# Patient Record
Sex: MALE | Race: WHITE | NOT HISPANIC OR LATINO | Employment: FULL TIME | URBAN - METROPOLITAN AREA
[De-identification: names, ages, dates, MRNs, and addresses within clinical notes are randomized per-mention and may not be internally consistent; named-entity substitution may affect disease eponyms.]

---

## 2017-06-09 ENCOUNTER — COMMUNICATION - HEALTHEAST (OUTPATIENT)
Dept: INTERNAL MEDICINE | Facility: CLINIC | Age: 42
End: 2017-06-09

## 2017-11-06 ENCOUNTER — OFFICE VISIT - HEALTHEAST (OUTPATIENT)
Dept: INTERNAL MEDICINE | Facility: CLINIC | Age: 42
End: 2017-11-06

## 2017-11-06 DIAGNOSIS — M10.9 GOUT: ICD-10-CM

## 2017-11-06 DIAGNOSIS — Z00.00 ANNUAL PHYSICAL EXAM: ICD-10-CM

## 2017-11-06 LAB
CHOLEST SERPL-MCNC: 264 MG/DL
FASTING STATUS PATIENT QL REPORTED: YES
HDLC SERPL-MCNC: 63 MG/DL
LDLC SERPL CALC-MCNC: 170 MG/DL
TRIGL SERPL-MCNC: 155 MG/DL

## 2017-11-06 ASSESSMENT — MIFFLIN-ST. JEOR: SCORE: 1741.49

## 2018-11-20 ENCOUNTER — OFFICE VISIT - HEALTHEAST (OUTPATIENT)
Dept: INTERNAL MEDICINE | Facility: CLINIC | Age: 43
End: 2018-11-20

## 2018-11-20 DIAGNOSIS — Z00.00 HEALTH CARE MAINTENANCE: ICD-10-CM

## 2018-11-20 DIAGNOSIS — F10.10 ALCOHOL ABUSE, EPISODIC DRINKING BEHAVIOR: ICD-10-CM

## 2018-11-20 DIAGNOSIS — M79.644 PAIN OF FINGER OF RIGHT HAND: ICD-10-CM

## 2018-11-20 DIAGNOSIS — Z00.00 ANNUAL PHYSICAL EXAM: ICD-10-CM

## 2018-11-20 LAB
ALBUMIN SERPL-MCNC: 4.4 G/DL (ref 3.5–5)
ALBUMIN UR-MCNC: ABNORMAL MG/DL
ALP SERPL-CCNC: 38 U/L (ref 45–120)
ALT SERPL W P-5'-P-CCNC: 20 U/L (ref 0–45)
ANION GAP SERPL CALCULATED.3IONS-SCNC: 8 MMOL/L (ref 5–18)
APPEARANCE UR: CLEAR
AST SERPL W P-5'-P-CCNC: 26 U/L (ref 0–40)
BACTERIA #/AREA URNS HPF: ABNORMAL HPF
BILIRUB SERPL-MCNC: 0.7 MG/DL (ref 0–1)
BILIRUB UR QL STRIP: NEGATIVE
BUN SERPL-MCNC: 17 MG/DL (ref 8–22)
CALCIUM SERPL-MCNC: 10.3 MG/DL (ref 8.5–10.5)
CHLORIDE BLD-SCNC: 101 MMOL/L (ref 98–107)
CHOLEST SERPL-MCNC: 257 MG/DL
CO2 SERPL-SCNC: 29 MMOL/L (ref 22–31)
COLOR UR AUTO: YELLOW
CREAT SERPL-MCNC: 1.07 MG/DL (ref 0.7–1.3)
ERYTHROCYTE [DISTWIDTH] IN BLOOD BY AUTOMATED COUNT: 11.1 % (ref 11–14.5)
FASTING STATUS PATIENT QL REPORTED: YES
GFR SERPL CREATININE-BSD FRML MDRD: >60 ML/MIN/1.73M2
GLUCOSE BLD-MCNC: 89 MG/DL (ref 70–125)
GLUCOSE UR STRIP-MCNC: NEGATIVE MG/DL
HBA1C MFR BLD: 5.2 % (ref 3.5–6)
HCT VFR BLD AUTO: 43.4 % (ref 40–54)
HDLC SERPL-MCNC: 84 MG/DL
HGB BLD-MCNC: 14.7 G/DL (ref 14–18)
HGB UR QL STRIP: NEGATIVE
HYALINE CASTS #/AREA URNS LPF: ABNORMAL LPF
KETONES UR STRIP-MCNC: ABNORMAL MG/DL
LDLC SERPL CALC-MCNC: 159 MG/DL
LEUKOCYTE ESTERASE UR QL STRIP: NEGATIVE
MCH RBC QN AUTO: 31.9 PG (ref 27–34)
MCHC RBC AUTO-ENTMCNC: 33.8 G/DL (ref 32–36)
MCV RBC AUTO: 94 FL (ref 80–100)
NITRATE UR QL: NEGATIVE
PH UR STRIP: 6 [PH] (ref 5–8)
PLATELET # BLD AUTO: 238 THOU/UL (ref 140–440)
PMV BLD AUTO: 7.6 FL (ref 7–10)
POTASSIUM BLD-SCNC: 4.5 MMOL/L (ref 3.5–5)
PROT SERPL-MCNC: 7.6 G/DL (ref 6–8)
RBC # BLD AUTO: 4.6 MILL/UL (ref 4.4–6.2)
RBC #/AREA URNS AUTO: ABNORMAL HPF
SODIUM SERPL-SCNC: 138 MMOL/L (ref 136–145)
SP GR UR STRIP: >=1.03 (ref 1–1.03)
SQUAMOUS #/AREA URNS AUTO: ABNORMAL LPF
TRIGL SERPL-MCNC: 70 MG/DL
UROBILINOGEN UR STRIP-ACNC: ABNORMAL
WBC #/AREA URNS AUTO: ABNORMAL HPF
WBC: 9.7 THOU/UL (ref 4–11)

## 2018-11-20 ASSESSMENT — MIFFLIN-ST. JEOR: SCORE: 1738.31

## 2019-01-30 ENCOUNTER — OFFICE VISIT - HEALTHEAST (OUTPATIENT)
Dept: INTERNAL MEDICINE | Facility: CLINIC | Age: 44
End: 2019-01-30

## 2019-01-30 DIAGNOSIS — E66.3 OVERWEIGHT (BMI 25.0-29.9): ICD-10-CM

## 2019-01-30 DIAGNOSIS — M13.10 MONOARTHRITIS: ICD-10-CM

## 2019-01-30 DIAGNOSIS — Z00.00 PHYSICAL EXAM: ICD-10-CM

## 2019-01-30 DIAGNOSIS — M10.9 PODAGRA: ICD-10-CM

## 2019-01-30 DIAGNOSIS — Z00.00 HEALTHCARE MAINTENANCE: ICD-10-CM

## 2019-01-30 DIAGNOSIS — Z11.3 SCREEN FOR STD (SEXUALLY TRANSMITTED DISEASE): ICD-10-CM

## 2019-01-30 LAB
ALBUMIN SERPL-MCNC: 4.8 G/DL (ref 3.5–5)
ALP SERPL-CCNC: 44 U/L (ref 45–120)
ALT SERPL W P-5'-P-CCNC: 27 U/L (ref 0–45)
ANION GAP SERPL CALCULATED.3IONS-SCNC: 13 MMOL/L (ref 5–18)
AST SERPL W P-5'-P-CCNC: 24 U/L (ref 0–40)
BILIRUB SERPL-MCNC: 0.5 MG/DL (ref 0–1)
BUN SERPL-MCNC: 13 MG/DL (ref 8–22)
CALCIUM SERPL-MCNC: 10.3 MG/DL (ref 8.5–10.5)
CHLORIDE BLD-SCNC: 103 MMOL/L (ref 98–107)
CHOLEST SERPL-MCNC: 226 MG/DL
CO2 SERPL-SCNC: 24 MMOL/L (ref 22–31)
CREAT SERPL-MCNC: 1.1 MG/DL (ref 0.7–1.3)
ERYTHROCYTE [DISTWIDTH] IN BLOOD BY AUTOMATED COUNT: 11.3 % (ref 11–14.5)
FASTING STATUS PATIENT QL REPORTED: YES
GFR SERPL CREATININE-BSD FRML MDRD: >60 ML/MIN/1.73M2
GLUCOSE BLD-MCNC: 81 MG/DL (ref 70–125)
HBA1C MFR BLD: 4.9 % (ref 3.5–6)
HCT VFR BLD AUTO: 46.3 % (ref 40–54)
HDLC SERPL-MCNC: 78 MG/DL
HGB BLD-MCNC: 15.5 G/DL (ref 14–18)
HIV 1+2 AB+HIV1 P24 AG SERPL QL IA: NEGATIVE
LDLC SERPL CALC-MCNC: 135 MG/DL
MCH RBC QN AUTO: 31.4 PG (ref 27–34)
MCHC RBC AUTO-ENTMCNC: 33.4 G/DL (ref 32–36)
MCV RBC AUTO: 94 FL (ref 80–100)
PLATELET # BLD AUTO: 245 THOU/UL (ref 140–440)
PMV BLD AUTO: 8.3 FL (ref 7–10)
POTASSIUM BLD-SCNC: 4.6 MMOL/L (ref 3.5–5)
PROT SERPL-MCNC: 8.4 G/DL (ref 6–8)
RBC # BLD AUTO: 4.92 MILL/UL (ref 4.4–6.2)
SODIUM SERPL-SCNC: 140 MMOL/L (ref 136–145)
TRIGL SERPL-MCNC: 67 MG/DL
WBC: 6 THOU/UL (ref 4–11)

## 2019-01-30 RX ORDER — CHLORAL HYDRATE 500 MG
2 CAPSULE ORAL DAILY
Status: SHIPPED | COMMUNITY
Start: 2019-01-30

## 2019-01-30 ASSESSMENT — MIFFLIN-ST. JEOR: SCORE: 1710.08

## 2019-01-31 LAB
C TRACH DNA SPEC QL PROBE+SIG AMP: NEGATIVE
HBV SURFACE AG SERPL QL IA: NEGATIVE
HCV AB SERPL QL IA: NEGATIVE
N GONORRHOEA DNA SPEC QL NAA+PROBE: NEGATIVE
T PALLIDUM AB SER QL: NEGATIVE

## 2020-01-29 ENCOUNTER — OFFICE VISIT - HEALTHEAST (OUTPATIENT)
Dept: INTERNAL MEDICINE | Facility: CLINIC | Age: 45
End: 2020-01-29

## 2020-01-29 DIAGNOSIS — E78.5 HYPERLIPIDEMIA LDL GOAL <130: ICD-10-CM

## 2020-01-29 DIAGNOSIS — E66.3 OVERWEIGHT (BMI 25.0-29.9): ICD-10-CM

## 2020-01-29 DIAGNOSIS — Z23 NEED FOR INFLUENZA VACCINATION: ICD-10-CM

## 2020-01-29 DIAGNOSIS — Z00.00 HEALTHCARE MAINTENANCE: ICD-10-CM

## 2020-01-29 DIAGNOSIS — Z84.81 FAMILY HISTORY OF BRCA GENE POSITIVE: ICD-10-CM

## 2020-01-29 DIAGNOSIS — Z00.00 ANNUAL PHYSICAL EXAM: ICD-10-CM

## 2020-01-30 ENCOUNTER — AMBULATORY - HEALTHEAST (OUTPATIENT)
Dept: LAB | Facility: CLINIC | Age: 45
End: 2020-01-30

## 2020-01-30 DIAGNOSIS — Z00.00 HEALTHCARE MAINTENANCE: ICD-10-CM

## 2020-01-30 DIAGNOSIS — Z84.81 FAMILY HISTORY OF BRCA GENE POSITIVE: ICD-10-CM

## 2020-01-30 DIAGNOSIS — Z00.00 ANNUAL PHYSICAL EXAM: ICD-10-CM

## 2020-01-30 DIAGNOSIS — E66.3 OVERWEIGHT (BMI 25.0-29.9): ICD-10-CM

## 2020-01-30 DIAGNOSIS — E78.5 HYPERLIPIDEMIA LDL GOAL <130: ICD-10-CM

## 2020-01-30 LAB
ALBUMIN SERPL-MCNC: 4.3 G/DL (ref 3.5–5)
ALP SERPL-CCNC: 51 U/L (ref 45–120)
ALT SERPL W P-5'-P-CCNC: 21 U/L (ref 0–45)
ANION GAP SERPL CALCULATED.3IONS-SCNC: 7 MMOL/L (ref 5–18)
AST SERPL W P-5'-P-CCNC: 24 U/L (ref 0–40)
BILIRUB SERPL-MCNC: 0.7 MG/DL (ref 0–1)
BUN SERPL-MCNC: 14 MG/DL (ref 8–22)
CALCIUM SERPL-MCNC: 9.7 MG/DL (ref 8.5–10.5)
CHLORIDE BLD-SCNC: 102 MMOL/L (ref 98–107)
CHOLEST SERPL-MCNC: 231 MG/DL
CO2 SERPL-SCNC: 30 MMOL/L (ref 22–31)
CREAT SERPL-MCNC: 1.38 MG/DL (ref 0.7–1.3)
ERYTHROCYTE [DISTWIDTH] IN BLOOD BY AUTOMATED COUNT: 10 % (ref 11–14.5)
FASTING STATUS PATIENT QL REPORTED: YES
GFR SERPL CREATININE-BSD FRML MDRD: 55 ML/MIN/1.73M2
GLUCOSE BLD-MCNC: 84 MG/DL (ref 70–125)
HBA1C MFR BLD: 5.1 % (ref 3.5–6)
HCT VFR BLD AUTO: 44.6 % (ref 40–54)
HDLC SERPL-MCNC: 69 MG/DL
HGB BLD-MCNC: 15 G/DL (ref 14–18)
LDLC SERPL CALC-MCNC: 154 MG/DL
MCH RBC QN AUTO: 32.4 PG (ref 27–34)
MCHC RBC AUTO-ENTMCNC: 33.6 G/DL (ref 32–36)
MCV RBC AUTO: 96 FL (ref 80–100)
PLATELET # BLD AUTO: 227 THOU/UL (ref 140–440)
PMV BLD AUTO: 9 FL (ref 7–10)
POTASSIUM BLD-SCNC: 4.6 MMOL/L (ref 3.5–5)
PROT SERPL-MCNC: 7.1 G/DL (ref 6–8)
RBC # BLD AUTO: 4.63 MILL/UL (ref 4.4–6.2)
SODIUM SERPL-SCNC: 139 MMOL/L (ref 136–145)
TRIGL SERPL-MCNC: 39 MG/DL
WBC: 5.5 THOU/UL (ref 4–11)

## 2020-02-12 ENCOUNTER — AMBULATORY - HEALTHEAST (OUTPATIENT)
Dept: FAMILY MEDICINE | Facility: CLINIC | Age: 45
End: 2020-02-12

## 2020-02-19 ENCOUNTER — AMBULATORY - HEALTHEAST (OUTPATIENT)
Dept: INTERNAL MEDICINE | Facility: CLINIC | Age: 45
End: 2020-02-19

## 2020-02-19 ENCOUNTER — OFFICE VISIT - HEALTHEAST (OUTPATIENT)
Dept: ONCOLOGY | Facility: HOSPITAL | Age: 45
End: 2020-02-19

## 2020-02-19 ENCOUNTER — HOSPITAL ENCOUNTER (OUTPATIENT)
Dept: CT IMAGING | Facility: CLINIC | Age: 45
Discharge: HOME | End: 2020-02-19

## 2020-02-19 ENCOUNTER — RECORDS - HEALTHEAST (OUTPATIENT)
Dept: ADMINISTRATIVE | Facility: OTHER | Age: 45
End: 2020-02-19

## 2020-02-19 DIAGNOSIS — Z71.83 ENCOUNTER FOR NONPROCREATIVE GENETIC COUNSELING: ICD-10-CM

## 2020-02-19 DIAGNOSIS — Z84.81 FAMILY HISTORY OF BRCA2 GENE POSITIVE: ICD-10-CM

## 2020-02-19 DIAGNOSIS — E78.5 HYPERLIPIDEMIA LDL GOAL <130: ICD-10-CM

## 2020-02-19 DIAGNOSIS — Z80.3 FAMILY HISTORY OF MALIGNANT NEOPLASM OF BREAST: ICD-10-CM

## 2020-02-19 DIAGNOSIS — R93.89 ABNORMAL CT OF THE CHEST: ICD-10-CM

## 2020-02-19 LAB
CV CALCIUM SCORE AGATSTON LM: 0
CV CALCIUM SCORING AGATSON LAD: 0
CV CALCIUM SCORING AGATSTON CX: 0
CV CALCIUM SCORING AGATSTON RCA: 1
CV CALCIUM SCORING AGATSTON TOTAL: 1

## 2020-02-21 ENCOUNTER — COMMUNICATION - HEALTHEAST (OUTPATIENT)
Dept: INTERNAL MEDICINE | Facility: CLINIC | Age: 45
End: 2020-02-21

## 2020-03-20 ENCOUNTER — HOSPITAL ENCOUNTER (OUTPATIENT)
Dept: CT IMAGING | Facility: HOSPITAL | Age: 45
Discharge: HOME | End: 2020-03-20

## 2020-03-20 DIAGNOSIS — R93.89 ABNORMAL CT OF THE CHEST: ICD-10-CM

## 2020-03-23 ENCOUNTER — COMMUNICATION - HEALTHEAST (OUTPATIENT)
Dept: INTERNAL MEDICINE | Facility: CLINIC | Age: 45
End: 2020-03-23

## 2020-03-31 ENCOUNTER — OFFICE VISIT - HEALTHEAST (OUTPATIENT)
Dept: ONCOLOGY | Facility: HOSPITAL | Age: 45
End: 2020-03-31

## 2020-03-31 DIAGNOSIS — Z80.3 FAMILY HISTORY OF MALIGNANT NEOPLASM OF BREAST: ICD-10-CM

## 2020-03-31 DIAGNOSIS — Z84.81 FAMILY HISTORY OF BRCA2 GENE POSITIVE: ICD-10-CM

## 2020-08-27 ENCOUNTER — COMMUNICATION - HEALTHEAST (OUTPATIENT)
Dept: INTERNAL MEDICINE | Facility: CLINIC | Age: 45
End: 2020-08-27

## 2020-08-28 ENCOUNTER — COMMUNICATION - HEALTHEAST (OUTPATIENT)
Dept: INTERNAL MEDICINE | Facility: CLINIC | Age: 45
End: 2020-08-28

## 2021-02-08 ENCOUNTER — OFFICE VISIT - HEALTHEAST (OUTPATIENT)
Dept: FAMILY MEDICINE | Facility: CLINIC | Age: 46
End: 2021-02-08

## 2021-02-08 DIAGNOSIS — M1A.9XX0 CHRONIC GOUT WITHOUT TOPHUS, UNSPECIFIED CAUSE, UNSPECIFIED SITE: ICD-10-CM

## 2021-02-08 DIAGNOSIS — E78.5 HYPERLIPIDEMIA WITH TARGET LDL LESS THAN 70: ICD-10-CM

## 2021-02-08 DIAGNOSIS — Z23 NEED FOR TD VACCINE: ICD-10-CM

## 2021-02-08 DIAGNOSIS — Z13.1 SCREENING FOR DIABETES MELLITUS: ICD-10-CM

## 2021-02-08 DIAGNOSIS — Z00.00 PREVENTATIVE HEALTH CARE: ICD-10-CM

## 2021-02-08 DIAGNOSIS — E66.3 OVERWEIGHT: ICD-10-CM

## 2021-02-08 DIAGNOSIS — Z11.3 ROUTINE SCREENING FOR STI (SEXUALLY TRANSMITTED INFECTION): ICD-10-CM

## 2021-02-08 DIAGNOSIS — N18.31 STAGE 3A CHRONIC KIDNEY DISEASE (H): ICD-10-CM

## 2021-02-08 LAB
ANION GAP SERPL CALCULATED.3IONS-SCNC: 11 MMOL/L (ref 5–18)
BUN SERPL-MCNC: 11 MG/DL (ref 8–22)
CALCIUM SERPL-MCNC: 9.5 MG/DL (ref 8.5–10.5)
CHLORIDE BLD-SCNC: 103 MMOL/L (ref 98–107)
CHOLEST SERPL-MCNC: 218 MG/DL
CO2 SERPL-SCNC: 27 MMOL/L (ref 22–31)
CREAT SERPL-MCNC: 1.07 MG/DL (ref 0.7–1.3)
ERYTHROCYTE [DISTWIDTH] IN BLOOD BY AUTOMATED COUNT: 12.2 % (ref 11–14.5)
FASTING STATUS PATIENT QL REPORTED: ABNORMAL
GFR SERPL CREATININE-BSD FRML MDRD: >60 ML/MIN/1.73M2
GLUCOSE BLD-MCNC: 102 MG/DL (ref 70–125)
HBA1C MFR BLD: 5.1 %
HCT VFR BLD AUTO: 44.3 % (ref 40–54)
HDLC SERPL-MCNC: 61 MG/DL
HGB BLD-MCNC: 15.2 G/DL (ref 14–18)
HIV 1+2 AB+HIV1 P24 AG SERPL QL IA: NEGATIVE
LDLC SERPL CALC-MCNC: 147 MG/DL
MCH RBC QN AUTO: 31.9 PG (ref 27–34)
MCHC RBC AUTO-ENTMCNC: 34.3 G/DL (ref 32–36)
MCV RBC AUTO: 93 FL (ref 80–100)
PLATELET # BLD AUTO: 245 THOU/UL (ref 140–440)
PMV BLD AUTO: 10.1 FL (ref 7–10)
POTASSIUM BLD-SCNC: 4.8 MMOL/L (ref 3.5–5)
RBC # BLD AUTO: 4.76 MILL/UL (ref 4.4–6.2)
SODIUM SERPL-SCNC: 141 MMOL/L (ref 136–145)
TRIGL SERPL-MCNC: 50 MG/DL
TSH SERPL DL<=0.005 MIU/L-ACNC: 1.8 UIU/ML (ref 0.3–5)
WBC: 6.5 THOU/UL (ref 4–11)

## 2021-02-08 RX ORDER — INDOMETHACIN 25 MG/1
CAPSULE ORAL
Status: SHIPPED | COMMUNITY
Start: 2020-11-24 | End: 2022-10-11

## 2021-02-08 ASSESSMENT — ANXIETY QUESTIONNAIRES
7. FEELING AFRAID AS IF SOMETHING AWFUL MIGHT HAPPEN: NOT AT ALL
6. BECOMING EASILY ANNOYED OR IRRITABLE: NOT AT ALL
3. WORRYING TOO MUCH ABOUT DIFFERENT THINGS: NOT AT ALL
2. NOT BEING ABLE TO STOP OR CONTROL WORRYING: NOT AT ALL
1. FEELING NERVOUS, ANXIOUS, OR ON EDGE: NOT AT ALL
4. TROUBLE RELAXING: NOT AT ALL
IF YOU CHECKED OFF ANY PROBLEMS ON THIS QUESTIONNAIRE, HOW DIFFICULT HAVE THESE PROBLEMS MADE IT FOR YOU TO DO YOUR WORK, TAKE CARE OF THINGS AT HOME, OR GET ALONG WITH OTHER PEOPLE: NOT DIFFICULT AT ALL
GAD7 TOTAL SCORE: 0
5. BEING SO RESTLESS THAT IT IS HARD TO SIT STILL: NOT AT ALL

## 2021-02-08 ASSESSMENT — MIFFLIN-ST. JEOR: SCORE: 1736.5

## 2021-02-08 ASSESSMENT — PATIENT HEALTH QUESTIONNAIRE - PHQ9: SUM OF ALL RESPONSES TO PHQ QUESTIONS 1-9: 0

## 2021-02-09 ENCOUNTER — COMMUNICATION - HEALTHEAST (OUTPATIENT)
Dept: FAMILY MEDICINE | Facility: CLINIC | Age: 46
End: 2021-02-09

## 2021-02-09 LAB
C TRACH DNA SPEC QL PROBE+SIG AMP: NEGATIVE
N GONORRHOEA DNA SPEC QL NAA+PROBE: NEGATIVE
T PALLIDUM AB SER QL: NEGATIVE

## 2021-02-24 ENCOUNTER — OFFICE VISIT - HEALTHEAST (OUTPATIENT)
Dept: FAMILY MEDICINE | Facility: CLINIC | Age: 46
End: 2021-02-24

## 2021-02-24 DIAGNOSIS — E78.2 MIXED HYPERLIPIDEMIA: ICD-10-CM

## 2021-02-24 DIAGNOSIS — M1A.40X0 OTHER SECONDARY CHRONIC GOUT WITHOUT TOPHUS, UNSPECIFIED SITE: ICD-10-CM

## 2021-02-24 ASSESSMENT — MIFFLIN-ST. JEOR: SCORE: 1736.95

## 2021-05-27 ASSESSMENT — PATIENT HEALTH QUESTIONNAIRE - PHQ9: SUM OF ALL RESPONSES TO PHQ QUESTIONS 1-9: 0

## 2021-05-28 ASSESSMENT — ANXIETY QUESTIONNAIRES: GAD7 TOTAL SCORE: 0

## 2021-05-31 VITALS — BODY MASS INDEX: 26.46 KG/M2 | HEIGHT: 71 IN | WEIGHT: 189 LBS

## 2021-06-02 VITALS — WEIGHT: 188.3 LBS | BODY MASS INDEX: 26.36 KG/M2 | HEIGHT: 71 IN

## 2021-06-02 VITALS — WEIGHT: 181.2 LBS | BODY MASS INDEX: 25.37 KG/M2 | HEIGHT: 71 IN

## 2021-06-04 VITALS
HEART RATE: 54 BPM | BODY MASS INDEX: 26.03 KG/M2 | WEIGHT: 185.3 LBS | DIASTOLIC BLOOD PRESSURE: 80 MMHG | SYSTOLIC BLOOD PRESSURE: 120 MMHG

## 2021-06-05 VITALS
HEIGHT: 71 IN | SYSTOLIC BLOOD PRESSURE: 104 MMHG | BODY MASS INDEX: 26.32 KG/M2 | DIASTOLIC BLOOD PRESSURE: 68 MMHG | WEIGHT: 188 LBS

## 2021-06-05 VITALS
HEART RATE: 50 BPM | WEIGHT: 187.9 LBS | SYSTOLIC BLOOD PRESSURE: 128 MMHG | TEMPERATURE: 98.1 F | OXYGEN SATURATION: 100 % | HEIGHT: 71 IN | DIASTOLIC BLOOD PRESSURE: 80 MMHG | BODY MASS INDEX: 26.31 KG/M2

## 2021-06-05 NOTE — PROGRESS NOTES
"Assessment:   1. Annual physical exam  Healthy adult male.  Unfortunately, I explained to him that my practice is somewhat restricted, and that I can only see certain physicians' patient panels through my collaborative practice model.  He would like to see a family medicine provider at the [facility] moving forward.    2. Healthcare maintenance  Colonoscopy and PSA to begin at age 48.  He would like his influenza vaccine today.    3. Family history of BRCA gene positive  His mother tested positive for the BRCA gene.  He has a male cousin who was diagnosed with breast cancer.  He would like to be screened for the BRCA gene.  I told him we could refer him to hematology/oncology for further evaluation.    4. Hyperlipidemia LDL goal <130  Borderline elevated LDL in the past, but has seen improvements in his fasting lipid profile with \"whole 30\" diet.  He wants further risk stratification with coronary artery calcium score testing.  I gave him the number for [facility] to set this up.    5. Overweight (BMI 26)  He is of a more muscular build and considers himself to be physically active.  He has cut down on alcohol since last year, which I congratulated him on.    6. Need for influenza vaccination  - Influenza, Seasonal Quad, PF =/> 6months         Plan:     Patient Instructions   Call [facility] to set up your coronary artery calcium score testing.  Number is [phone].    We will help you schedule a \"lab only\" appointment today.    I placed a referral to genetic counseling today for BRCA testing.  Call [phone].  This does not look like something I will be able to screen for today.    Blood pressure and other vital signs look good today.    When your lab results come through, I will fax your biometric form for you.    I recommend that you set up a \"establish care\" appointment with a family practice physician at the [facility].      Subjective:     Here for physical exam. Chart " reviewed    Dentist every 6 months.  No new hearing or vision changes.  No problems with depression or anxiety.  Works as a  for [employer].  Happy with work.  In a monogamous relationship with his girlfriend.  Does not smoke.      No past medical history on file.  No past surgical history on file.  Patient has no known allergies.  Family History   Problem Relation Age of Onset     Hyperlipidemia Mother      No Medical Problems Father      No Medical Problems Brother      No Medical Problems Daughter      No Medical Problems Son      Social History     Socioeconomic History     Marital status:      Spouse name: Not on file     Number of children: Not on file     Years of education: Not on file     Highest education level: Not on file   Occupational History     Not on file   Social Needs     Financial resource strain: Not on file     Food insecurity:     Worry: Not on file     Inability: Not on file     Transportation needs:     Medical: Not on file     Non-medical: Not on file   Tobacco Use     Smoking status: Never Smoker     Smokeless tobacco: Never Used   Substance and Sexual Activity     Alcohol use: Yes     Alcohol/week: 10.0 standard drinks     Types: 10 Cans of beer per week     Drug use: No     Sexual activity: Yes     Partners: Female   Lifestyle     Physical activity:     Days per week: Not on file     Minutes per session: Not on file     Stress: Not on file   Relationships     Social connections:     Talks on phone: Not on file     Gets together: Not on file     Attends Latter day service: Not on file     Active member of club or organization: Not on file     Attends meetings of clubs or organizations: Not on file     Relationship status: Not on file     Intimate partner violence:     Fear of current or ex partner: Not on file     Emotionally abused: Not on file     Physically abused: Not on file     Forced sexual activity: Not on file   Other Topics Concern     Not on file   Social History  Narrative     Not on file         Review of Systems  Please see form B           Objective:     Vitals:    01/29/20 1304   BP: 120/80   Pulse: (!) 54   Weight: 185 lb 4.8 oz (84.1 kg)       Physical  General Appearance: Alert, cooperative, no distress, appears stated age  Head: Normocephalic, without obvious abnormality, atraumatic  Eyes: PERRL, fundi not observed, EOM's intact  Ears: Normal TM's and external ear canals bilateral  Nose: No abnormalities noted  Mouth and Throat: Normal appearance   Neck: Supple, symmetrical, trachea midline, no adenopathy or thyromegally,  no tenderness/mass/nodules; no carotid bruit or JVD  Back: no CVA tenderness or spinous process pain  Lungs: Clear to auscultation bilaterally, good air movent  Heart: Regular rate and rhythm, S1 and S2 normal, no murmur, rub, or gallop,  Abdomen: Soft, non-tender, no masses, no organomegaly  Genitourinary:.  No hernias   Rectal exam:    Musculoskeletal: No gross abnormalities    Extremities: Extremities normal, atraumatic, no cyanosis or edema, pulses 2/4 and symmetric  Skin:  no  worrisome lesions noted  Lymph nodes: Cervical, supraclavicular, groin, and axillary nodes normal  Neurologic: CN2-12 intact, normal sensation, DTRs 2/4 and symmetric.   Psychiatric:  normal mood and affect.      Current Outpatient Medications   Medication Sig Dispense Refill     cholecalciferol, vitamin D3, (VITAMIN D3 ORAL) Take 1-2 tablets by mouth daily.       MULTIVITAMIN ORAL Take 1 tablet by mouth daily.       omega-3/dha/epa/fish oil (FISH OIL-OMEGA-3 FATTY ACIDS) 300-1,000 mg capsule Take 2 g by mouth daily.       No current facility-administered medications for this visit.

## 2021-06-05 NOTE — PATIENT INSTRUCTIONS - HE
"Call [facility] to set up your coronary artery calcium score testing.  Number is [phone].    We will help you schedule a \"lab only\" appointment today.    I placed a referral to genetic counseling today for BRCA testing.  Call [phone].  This does not look like something I will be able to screen for today.    Blood pressure and other vital signs look good today.    When your lab results come through, I will fax your biometric form for you.    I recommend that you set up a \"establish care\" appointment with a family practice physician at the [facility].  "

## 2021-06-06 NOTE — TELEPHONE ENCOUNTER
"Please call patient and inform him about his coronary artery calcium score test.    His score was 1, placing him at a low risk of future cardiac events.  Based on his other risk factors, I would say we do not have to start cholesterol medication or low-dose aspirin right now.    I would encourage him to schedule a \"establish care\" with a family practice provider within the next 3 to 6 months.    We will mail the results to him.    "

## 2021-06-06 NOTE — PROGRESS NOTES
2/19/2020    Referring Provider: Dr. Couch    Presenting Information:   I met with Kem Almonte today for genetic counseling at the [facility] to discuss about the known BRCA2 mutation in his family. Specifically, the familial BRCA2 mutation is a deletion of exons 1 through 2. He is here today to review this history, cancer screening recommendations, and available genetic testing options.    Personal History:  Kem is a 45 y.o. male.  He  does not have any personal history of cancer.      He has not had a colonoscopy yet due to his age.   He does not regularly do any other cancer screening at this time.  Kem reported no tobacco use, and about 8 drinks per week for alcohol use.    Family History: (Please see scanned pedigree for detailed family history information)  Children/Siblings    Khanh has one daughter, age 16, and one son, age 14; both reportedly healthy.    Khanh has two brothers, ages 55 and 58; both with no reported cancer histories. Khanh shared that he does not believe either of his brothers have completed genetic testing at this time.   Maternal    Khanh's mother, age 79, has no personal history of cancer but underwent genetic testing due to her sister's history and tested positive for a pathogenic BRCA2 mutation.    Khanh's maternal aunt, age 74, has a history of breast cancer at age 42 and an unknown type of skin cancer at age 53. This aunt underwent LEELA-BSO in 2004. Khanh reports that he believes that this aunt has also tested positive for the same BRCA2 mutation.     Khanh's maternal grandmother passed away at age 69 with a history of breast cancer diagnosed at age 60.    Khanh's maternal grandmother's has a brother who had a history of an unknown type of cancer.    Khanh's maternal grandmother's brother's son was diagnosed with a breast cancer at age 60. Khanh reports that he believes this relative has tested positive for the familial BRCA2 mutation.    Khanh's  maternal grandmother has another brother has a daughter who has a history of cervical cancer and a grandson with a history of pancreatic cancer in his late 50's.    Khanh's maternal grandmother has another brother who has a daughter with a history of breast cancer in her mid 70's.     Khanh's maternal grandmother's mother passed away in her 40's from breast cancer diagnosed in her 40's.  Paternal    Khanh's father, age 79, has no reported cancer history    Khanh has two paternal aunts, ages 73 and 77, and one paternal uncle, age 70, with no reported cancer history.     Khanh's paternal grandmother passed away at age 93 from Alzheimer's disease and no reported cancer history.    Khanh's paternal grandfather passed away at age 95 from age-related health issues with no reported cancer history.    His maternal ethnicity is Jamaican, English, Citizen of the [place], and Syrian [place]ian. His paternal ethnicity is Frisian and Citizen of the [place].  There is no known Ashkenazi Worship ancestry on either side of his family. There is no reported consanguinity.    Discussion:    Cj has a family history of female breast, male breast, and pancreatic cancer with a known familial BRCA2 mutation in his mother. Specifically, the familial BRCA2 mutation identified in his mother is a deletion of exons 1 through 2.    We discussed the natural history and genetics of Hereditary Breast and Ovarian Cancer caused by mutations in the BRCA2 gene. A detailed handout regarding the information we discussed was provided to Kem at the end of our appointment today.  Topics included: inheritance pattern, cancer risks, cancer screening recommendations, and also risks, benefits and limitations of testing.    We discussed the autosomal dominant inheritance of pathogenic mutations in the BRCA2 gene. Based on Kem's mother's genetic test result, Kem has a 50% chance of also carrying the same genetic change in the BRCA2 gene.    Based on this, Kem meets the National  Comprehensive Cancer Network (NCCN) criteria for genetic testing of the familial BRCA2 mutation.      We reviewed that the most common cause of hereditary breast cancer is Hereditary Breast and Ovarian Cancer (HBOC) syndrome, which is caused by mutations in the genes BRCA1 and BRCA2.  WBRCA1 and BRCA2 are two genes that increase the risk for breast and ovarian cancers, among others. Women who inherit a BRCA mutation have a 50 to 85% lifetime risk of breast cancer and a 15 to 45% lifetime risk of ovarian cancer. This is higher than the general population lifetime risks of 12% for breast cancer and less than 2% for ovarian cancer. Men with BRCA gene mutations have up to a 7% risk of breast cancer and 20% risk of prostate cancer. Other cancers, such as pancreatic cancer and melanoma, have also been associated with BRCA mutations.    We discussed the implications of both positive and negative test result for Kem.    If testing comes back positive, Kem will be recommended to follow the screening recommendations for men with BRCA2 mutations as published by the National Comprehensive Cancer Network (NCCN). Men with BRCA2 mutations qualify for breast cancer screening in the form of clinical breast exams once per year starting at age 35 and annual PSA screening starting at age 45.     If testing comes back negative, Kem would not be at an increased risk for BRCA2-associated cancers.     Genetic testing is available for: BRCA2 site-specific testing for the known familial mutation (deletion of exons 1 through 2).    Consent was obtained and site-specific BRCA2 testing was sent to [facility]. A copy of his mother's BRCA2 positive genetic testing report from [facility] was sent along with the testing to the laboratory.     Medical Management: For Kem, we reviewed that the information from genetic testing may determine:    additional cancer screening for which Kem may qualify (i.e. clinical breast exams, PSA  screening, more frequent dermatologic exams, etc.),     and targeted chemotherapies for Kem  if he were to develop certain cancers in the future (i.e. Immunotherapy for individuals with Enciso syndrome, PARP inhibitors, etc.).      These recommendations will be discussed in detail once genetic testing is completed.    Plan:  1) Today Kem elected to proceed with site-specific BRCA2 genetic testing through [facility].  2) This information should be available in approximately 2 weeks.  3) Kem will be contacted by our scheduling department to set up a result disclosure appointment either in person or over the phone.     Face to face time: 45 minutes    Trinidad Mo MS, [facility]  Licensed Genetic Counselor  [facility]  [phone]

## 2021-06-07 NOTE — TELEPHONE ENCOUNTER
Please call the patient and let him know that his CT scan is back. It looks normal. The inflammation that was seen on his prior CT scan is now gone.    Please mail him results.

## 2021-06-07 NOTE — PROGRESS NOTES
"3/31/2020    Referring Provider: Garry Couch CNP    Presenting Information:  I spoke with Khanh over the phone todayto discuss his genetic testing results. His blood was drawn on 2/19/2020 and we ordered site-specific BRCA2 testing from [facility]. This testing was done because of his family history of breast cancer and his mother's positive genetic test results for a mutation in the BRCA2 gene.    Genetic Testing Results: NEGATIVE   Kem's test results were negative. The mutation that was identified in his mother was in the BRCA2 gene. Specifically this mutation is called 5'UTR_EX1del (also known as del exons 1-2). Kem does not have the mutation previously identified in his mother. This test only looked for this one mutation.    A copy of the test report can be found in the Media tab and named \"Genetics [facility]\". The report is scanned in as a linked document.    Interpretation:  Based on this test result, Kem does not have Hereditary Breast and Ovarian Cancer syndrome and is not at an increased risk for the associated cancers. Even though he does not have the familial BRCA2 mutation, he is still at the general population lifetime risk for the BRCA2 associated cancers.       Screening:  We discussed the importance of cancer screening based on Kem's personal and family history:    Other population cancer screening options, such as those recommended by the American Cancer Society and the National Comprehensive Cancer Network (NCCN), are also appropriate for Kem and his family. These screening recommendations may change if there are changes to Kem's personal and/or family history. Final screening recommendations should be made by each individual's managing physician.    Inheritance:  We reviewed the autosomal dominant inheritance of this BRCA2 mutation. We discussed that Kem cannot pass on this mutation to his children based on this test result.  Mutations in this gene do not skip " generations.      Additional Testing Considerations:  Although Kem's genetic testing result was negative, other relatives may still carry the familial BRCA2 mutation and/or a different gene mutation associated with the cancers in his family. Genetic counseling is recommended for his brothers and extended maternal relatives to discuss genetic testing options. If any of these relatives do pursue genetic testing, Kem is encouraged to contact me so that we may review the impact of their test results on Kem.    Plan:  1. A copy of his test results will be mailed to Khanh.   2. Kem is encouraged to contact me annually and/or with any changes to his personal/familiy history, as this information may inform future cancer screening or genetic testing recommendations.  3. If there are any further questions or concerns, he should not hesitate to contact me at [phone].    Time spent over the phone: 10 minutes    Trinidad Mo MS, [facility]  Licensed Genetic Counselor  [facility]  [phone]

## 2021-06-13 NOTE — PROGRESS NOTES
Subjective:     Kem Almonte is a 42 y.o. male who presents for an annual exam. 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 He needs biometric labs today. He has a history of gout but has not had a flare up in some time. He has controlled it he says by cutting down on grains. E    He has no concerns today. He feels well emotionally. He has a teenage daughter. He is  and is currently in monogamous relationship with a new partner.        The patient reports that there is not domestic violence in his life.     Healthy Habits:   Regular Exercise: Yes  Sunscreen Use: Yes  Healthy Diet: Yes  Dental Visits Regularly: Yes  Sexually active: Yes  Colonoscopy: No    Immunization History   Administered Date(s) Administered     Hep A, historic 07/15/2011, 01/17/2012     Hep B, historic 07/15/2011, 01/17/2012, 04/17/2012     Influenza, seasonal,quad inj 36+ mos 09/28/2015, 11/06/2017     Tdap 07/15/2011     Typhoid Live 07/15/2011     Immunization status: up to date and documented.    No current outpatient prescriptions on file.     No current facility-administered medications for this visit.      No past medical history on file.  No past surgical history on file.  Review of patient's allergies indicates no known allergies.  Family History   Problem Relation Age of Onset     Hyperlipidemia Mother      No Medical Problems Father      No Medical Problems Brother      No Medical Problems Daughter      No Medical Problems Son      Social History     Social History     Marital status:      Spouse name: N/A     Number of children: N/A     Years of education: N/A     Occupational History     Not on file.     Social History Main Topics     Smoking status: Never Smoker     Smokeless tobacco: Never Used     Alcohol use 6.0 oz/week     10 Cans of beer per week     Drug use: No     Sexual activity: Yes     Partners: Female     Other Topics Concern     Not on file     Social History Narrative  "      Review of Systems  General:  Negative   Eyes: Negative  Ears/Nose/Throat: Negative  Cardiovascular: Negative   Respiratory:  Negative  Gastrointestinal:  Negative   Genitourinary: Negative   Musculoskeletal:  Negative  Skin: Negative   Neurologic: Negative   Psychiatric: Negative   Endocrine: Negative  Heme/Lymphatic:Negative   Allergic/Immunologic: Negative      Objective:     /84  Pulse 82  Ht 5' 10.5\" (1.791 m)  Wt 189 lb (85.7 kg)  SpO2 97%  BMI 26.74 kg/m2    Physical  General Appearance: Alert, cooperative, no distress, appears stated age  Head: Normocephalic, without obvious abnormality, atraumatic  Eyes: PERRL, conjunctiva/corneas clear, EOM's intact  Ears: Normal TM's and external ear canals, both ears  Nose: Nares normal, septum midline,mucosa normal, no drainage  Throat: Lips, mucosa, and tongue normal.  Neck: Supple, symmetrical, trachea midline, no adenopathy;  thyroid: not enlarged, symmetric, no tenderness/mass/nodules.  Back: Symmetric, no curvature, ROM normal, no CVA tenderness  Lungs: Clear to auscultation bilaterally, respirations unlabored  Heart: Regular rate and rhythm, S1 and S2 normal, no murmur, rub, or gallop,  Abdomen: Soft, non-tender, bowel sounds active all four quadrants,  no masses, no organomegaly  Genitourinary: Penis normal.  No scrotal masses.  No hernias palpated.  Musculoskeletal: Normal range of motion. No joint swelling or deformity.   Extremities: Extremities normal, atraumatic, no cyanosis or edema  Skin: Skin color, texture, turgor normal, no rashes or lesions  Lymph nodes: Cervical, supraclavicular, and axillary nodes normal  Neurologic: He is alert. He has normal reflexes.   Psychiatric: He has a normal mood and affect.     Assessment/Plan :     Healthy male exam.    1. Annual physical exam  Basic Metabolic Panel    Lipid Cascade    Influenza, Seasonal,Quad Inj, 36+ MOS   2. Gout - will check a uric acid today Uric Acid     Once labs are back, will fill " out his biometric screening form and fax results to [facility]    I recommended he eat a healthy diet and get regular exercise.    Kleber Gore MD  [facility]

## 2021-06-15 NOTE — PROGRESS NOTES
Progress Note     ASSESSMENT/PLAN:    1. Mixed hyperlipidemia     2. 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 Mild elevation of LDL and total cholestrol. HDL within range. Patient is already realtively halthy. Discussed ways to tweak his diet to help bring that number down further. No need for statin therapy. ASCVD 1.8%.     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 No refills on indomethacin need. Has an aviation doctor and he does the refills for him.     Has baseline EKG done via the aviation doctor later in the year and he will send me a record of them       There are no discontinued medications.    RTO: 1 year for prevent     No follow-ups on file.    CHIEF COMPLAINT:  Chief Complaint   Patient presents with     Establish Care     no concerns       HISTORY OF PRESENT ILLNESS:  Kem Almonte is a 46 y.o. male who presents today for review of his bloodwork.       REVIEW OF SYSTEMS:   All other systems are negative.      TOBACCO USE:  Social History     Tobacco Use   Smoking Status Never Smoker   Smokeless Tobacco Never Used       MEDICATIONS:  Current Outpatient Medications   Medication Sig Dispense Refill     cholecalciferol, vitamin D3, (VITAMIN D3 ORAL) Take 1-2 tablets by mouth daily.       ID NOW COVID-19 TEST KIT Kit TEST AS DIRECTED       indomethacin (INDOCIN) 25 MG capsule        MULTIVITAMIN ORAL Take 1 tablet by mouth daily.       omega-3/dha/epa/fish oil (FISH OIL-OMEGA-3 FATTY ACIDS) 300-1,000 mg capsule Take 2 g by mouth daily.       No current facility-administered medications for this visit.          Immunization History   Administered Date(s) Administered     Hep A, Adult IM (19yr & older) 07/15/2011, 01/17/2012     Hep A, historic 07/15/2011, 01/17/2012     Hep B, Adult 07/15/2011, 01/17/2012, 04/17/2012     Hep  "B, historic 07/15/2011, 01/17/2012, 04/17/2012     INFLUENZA,SEASONAL QUAD, PF, =/> 6months 01/29/2020     Influenza, inj, historic,unspecified 09/28/2015, 11/06/2017, 11/01/2020     Influenza,seasonal, Inj IIV3 11/06/2003     Influenza,seasonal,quad inj =/> 6months 09/28/2015, 11/06/2017     Td, adult adsorbed, PF 02/08/2021     Tdap 07/15/2011     Typhoid Live, Oral 07/15/2011         VITALS:  Vitals:    02/24/21 1101   BP: 104/68   Patient Site: Right Arm   Patient Position: Sitting   Cuff Size: Adult Regular   Weight: 188 lb (85.3 kg)   Height: 5' 10.5\" (1.791 m)     Wt Readings from Last 3 Encounters:   02/24/21 188 lb (85.3 kg)   02/08/21 187 lb 14.4 oz (85.2 kg)   01/29/20 185 lb 4.8 oz (84.1 kg)     Body mass index is 26.59 kg/m .      PHYSICAL EXAM:  Constitutional:  Reveals a pleasant male, NAD.  Vitals:  Per nursing notes.  Psychiatric:  Mood appropriate, memory intact.     "

## 2021-06-15 NOTE — TELEPHONE ENCOUNTER
Biometric form completed by Dr. Rai. Faxed form to [employer] at [phone]. Original placed in scanning. Left voicemail for patient relaying that this was done.

## 2021-06-16 PROBLEM — M1A.9XX0 CHRONIC GOUT WITHOUT TOPHUS, UNSPECIFIED CAUSE, UNSPECIFIED SITE: Status: ACTIVE | Noted: 2021-02-08

## 2021-06-16 PROBLEM — E78.5 HYPERLIPIDEMIA WITH TARGET LDL LESS THAN 70: Status: ACTIVE | Noted: 2021-02-08

## 2021-06-20 NOTE — LETTER
"Letter by Trinidad Mo, Genetic Counselor at      Author: Trinidad Mo, Genetic Counselor Service: -- Author Type: --    Filed:  Encounter Date: 3/31/2020 Status: (Other)         Kem Almonte  [address] [facility] [address] [facility] [address]      March 31, 2020      Dear Mr. Almonte,    It was a pleasure speaking with you on the phone on 03/31/202.  Here is a copy of the progress note from our discussion.  If you have any additional questions, please feel free to call.    Referring Provider: Garry Couch CNP    Presenting Information:  I spoke with Khanh over the phone todayto discuss his genetic testing results. His blood was drawn on 2/19/2020 and we ordered site-specific BRCA2 testing from [facility]. This testing was done because of his family history of breast cancer and his mother's positive genetic test results for a mutation in the BRCA2 gene.    Genetic Testing Results: NEGATIVE   Kem's test results were negative. The mutation that was identified in his mother was in the BRCA2 gene. Specifically this mutation is called 5'UTR_EX1del (also known as del exons 1-2). Kem does not have the mutation previously identified in his mother. This test only looked for this one mutation.    A copy of the test report can be found in the Media tab and named \"Genetics Scan-Subtextual\". The report is scanned in as a linked document.    Interpretation:  Based on this test result, Kem does not have Hereditary Breast and Ovarian Cancer syndrome and is not at an increased risk for the associated cancers. Even though he does not have the familial BRCA2 mutation, he is still at the general population lifetime risk for the BRCA2 associated cancers.       Screening:  We discussed the importance of cancer screening based on Kem's personal and family history:    Other population cancer screening options, such as those recommended by the American Cancer Society and the National Comprehensive Cancer Network " (NCCN), are also appropriate for Kem and his family. These screening recommendations may change if there are changes to Kem's personal and/or family history. Final screening recommendations should be made by each individual's managing physician.    Inheritance:  We reviewed the autosomal dominant inheritance of this BRCA2 mutation. We discussed that Kem cannot pass on this mutation to his children based on this test result.  Mutations in this gene do not skip generations.      Additional Testing Considerations:  Although Kem's genetic testing result was negative, other relatives may still carry the familial BRCA2 mutation and/or a different gene mutation associated with the cancers in his family. Genetic counseling is recommended for his brothers and extended maternal relatives to discuss genetic testing options. If any of these relatives do pursue genetic testing, Kem is encouraged to contact me so that we may review the impact of their test results on Kem.    Plan:  1. A copy of his test results will be mailed to Khanh.   2. Kem is encouraged to contact me annually and/or with any changes to his personal/familiy history, as this information may inform future cancer screening or genetic testing recommendations.  3. If there are any further questions or concerns, he should not hesitate to contact me at [phone].    Trinidad Mo MS, [facility]  Licensed Genetic Counselor  [facility]  [phone]

## 2021-06-20 NOTE — LETTER
Letter by Garry Couch CNP at      Author: Garry Couch CNP Service: -- Author Type: --    Filed:  Encounter Date: 2/21/2020 Status: (Other)         Kem Almonte  [address] [facility] [address]             February 21, 2020         Dear Mr. Almonte,    Below are the results from your recent visit:    Resulted Orders   CT Cardiac Calcium Score   Result Value Ref Range    Agatston Score of Left Main 0     Agatston Score of the Left Anterior Descending 0     Agatston Score of Circumflex 0     Agatston Score of Right Coronary Artery 1     Total Score 1.00     Narrative      The total Agatston calcium score is 1. A calcium score in this range   places the individual in the 25-50th percentile when compared to an age   and gender matched control group and implies a low risk of cardiac events   in the next ten years (less than 1% per year).          I do not think we need to start cholesterol medication or low-dose aspirin right now.    Please call with questions or contact us using [employer].    Sincerely,        Electronically signed by Garry Couch CNP

## 2021-06-20 NOTE — LETTER
Letter by Trinidad Mo, Genetic Counselor at      Author: Trinidad Mo, Genetic Counselor Service: -- Author Type: --    Filed:  Encounter Date: 2/19/2020 Status: (Other)         Kem Almonte  [address] [address]      February 21, 2020      Dear Mr. Almonte,    It was a pleasure meeting with you at [facility] on 02/19/2020.  Here is a copy of the progress note from your recent genetic counseling visit.  If you have any additional questions, please feel free to call.    Referring Provider: Dr. Couch    Presenting Information:   I met with Kem Almonte today for genetic counseling at the [facility] to discuss about the known BRCA2 mutation in his family. Specifically, the familial BRCA2 mutation is a deletion of exons 1 through 2. He is here today to review this history, cancer screening recommendations, and available genetic testing options.    Personal History:  Kem is a 45 y.o. male.  He  does not have any personal history of cancer.      He has not had a colonoscopy yet due to his age.   He does not regularly do any other cancer screening at this time.  Kem reported no tobacco use, and about 8 drinks per week for alcohol use.    Family History: (Please see scanned pedigree for detailed family history information)  Children/Siblings    Khanh has one daughter, age 16, and one son, age 14; both reportedly healthy.    Khanh has two brothers, ages 55 and 58; both with no reported cancer histories. Khanh shared that he does not believe either of his brothers have completed genetic testing at this time.   Maternal    Khanh's mother, age 79, has no personal history of cancer but underwent genetic testing due to her sister's history and tested positive for a pathogenic BRCA2 mutation.    Khanh's maternal aunt, age 74, has a history of breast cancer at age 42 and an unknown type of skin cancer at age 53. This aunt  underwent LEELA-BSO in 2004. Khanh reports that he believes that this aunt has also tested positive for the same BRCA2 mutation.     Khanh's maternal grandmother passed away at age 69 with a history of breast cancer diagnosed at age 60.    Khanh's maternal grandmother's has a brother who had a history of an unknown type of cancer.    Khanh's maternal grandmother's brother's son was diagnosed with a breast cancer at age 60. Khanh reports that he believes this relative has tested positive for the familial BRCA2 mutation.    Khanh's maternal grandmother has another brother has a daughter who has a history of cervical cancer and a grandson with a history of pancreatic cancer in his late 50's.    Khanh's maternal grandmother has another brother who has a daughter with a history of breast cancer in her mid 70's.     Khanh's maternal grandmother's mother passed away in her 40's from breast cancer diagnosed in her 40's.  Paternal    Khanh's father, age 79, has no reported cancer history    Khanh has two paternal aunts, ages 73 and 77, and one paternal uncle, age 70, with no reported cancer history.     Khanh's paternal grandmother passed away at age 93 from Alzheimer's disease and no reported cancer history.    Khanh's paternal grandfather passed away at age 95 from age-related health issues with no reported cancer history.    His maternal ethnicity is [place], English, [place] Islander, and Faroese Bangladeshi. His paternal ethnicity is [place] and [place].  There is no known Ashkenazi Adventism ancestry on either side of his family. There is no reported consanguinity.    Discussion:    Cj has a family history of female breast, male breast, and pancreatic cancer with a known familial BRCA2 mutation in his mother. Specifically, the familial BRCA2 mutation identified in his mother is a deletion of exons 1 through 2.    We discussed the natural history and genetics of Hereditary Breast and Ovarian Cancer caused by mutations in the BRCA2 gene. A  detailed handout regarding the information we discussed was provided to Kem at the end of our appointment today.  Topics included: inheritance pattern, cancer risks, cancer screening recommendations, and also risks, benefits and limitations of testing.    We discussed the autosomal dominant inheritance of pathogenic mutations in the BRCA2 gene. Based on Kem's mother's genetic test result, Kem has a 50% chance of also carrying the same genetic change in the BRCA2 gene.    Based on this, Kem meets the National Comprehensive Cancer Network (NCCN) criteria for genetic testing of the familial BRCA2 mutation.       We reviewed that the most common cause of hereditary breast cancer is Hereditary Breast and Ovarian Cancer (HBOC) syndrome, which is caused by mutations in the genes BRCA1 and BRCA2.  WBRCA1 and BRCA2 are two genes that increase the risk for breast and ovarian cancers, among others. Women who inherit a BRCA mutation have a 50 to 85% lifetime risk of breast cancer and a 15 to 45% lifetime risk of ovarian cancer. This is higher than the general population lifetime risks of 12% for breast cancer and less than 2% for ovarian cancer. Men with BRCA gene mutations have up to a 7% risk of breast cancer and 20% risk of prostate cancer. Other cancers, such as pancreatic cancer and melanoma, have also been associated with BRCA mutations.    We discussed the implications of both positive and negative test result for Kem.    If testing comes back positive, Kem will be recommended to follow the screening recommendations for men with BRCA2 mutations as published by the National Comprehensive Cancer Network (NCCN).  Men with BRCA2 mutations qualify for breast cancer screening in the form of clinical breast exams once per year starting at age 35 and annual PSA screening starting at age 45.     If testing comes back negative, Kem would not be at an increased risk for BRCA2-associated cancers.      Genetic testing is available for: BRCA2 site-specific testing for the known familial mutation  (deletion of exons 1 through 2).    Consent was obtained and site-specific BRCA2 testing was sent to [facility]. A copy of his mother's BRCA2 positive genetic testing report from [facility] was sent along with the testing to the laboratory.     Medical Management: For  Kem, we reviewed that the information from genetic testing may determine:    additional cancer screening for which Kem may qualify (i.e. clinical breast exams, PSA screening, more frequent dermatologic exams, etc.),     and targeted chemotherapies for Kem  if he were to develop certain cancers in the future (i.e. Immunotherapy for individuals with Enciso syndrome, PARP inhibitors, etc.).      These recommendations will be discussed in detail once genetic testing is completed.    Plan:  1) Today Kem elected to proceed with site-specific BRCA2 genetic testing through [facility].  2) This information should be available in approximately 2 weeks.  3) Kem will be contacted by our scheduling department to set up a result disclosure appointment either in person or over the phone.     Trinidad Mo MS, [facility]  Licensed Genetic Counselor  [facility]  [phone]

## 2021-06-21 NOTE — PROGRESS NOTES
Assessment:   1. Annual physical exam  Healthy adult male.    2. Health care maintenance  He is not due for PSA or colonoscopy today.  He would like to hold off on influenza vaccination.  - HM2(CBC w/o Differential)  - Comprehensive Metabolic Panel  - Lipid Profile  - Urinalysis    3. Pain of finger of right hand  Apparently the index finger on his right hand was struck by a hockey puck during a hockey game back in June, approximately 5 months ago.  He has subsequently developed some pain at the distal portion of the affected finger that has not gone away over the last 5 months.  - Ambulatory referral to Orthopedic Surgery    4. Alcohol abuse, episodic drinking behavior  He drinks 3-5 nights per week but says that he will drink about 4-10 alcoholic beverages nights that he does drink.         Plan:     Patient Instructions   We will check a variety of lab work today.  I will contact you via my chart with the results as they come back to me.  I will fill out your paperwork and have that faxed in once all of the labs result.    Trying to cut down on your drinking.  Ideally, you would have less than 2 alcoholic beverages per day.    I placed an order for orthopedics today.  Please call [facility] at [phone] to schedule an appointment.  I would request Dr. Murray, he is the most experienced hand surgeon that I know.    If you have a recurrent issue with gout; you could try using naproxen 500 mg twice daily with heat on the affected joint to see if this helps.  We had a discussion about starting allopurinol today, we will hold off on this for now.      Subjective:     Here for physical exam. Chart reviewed       No past medical history on file.  No past surgical history on file.  Patient has no known allergies.  Family History   Problem Relation Age of Onset     Hyperlipidemia Mother      No Medical Problems Father      No Medical Problems Brother      No Medical Problems Daughter      No Medical Problems  "Son      Social History     Socioeconomic History     Marital status:      Spouse name: Not on file     Number of children: Not on file     Years of education: Not on file     Highest education level: Not on file   Social Needs     Financial resource strain: Not on file     Food insecurity - worry: Not on file     Food insecurity - inability: Not on file     Transportation needs - medical: Not on file     Transportation needs - non-medical: Not on file   Occupational History     Not on file   Tobacco Use     Smoking status: Never Smoker     Smokeless tobacco: Never Used   Substance and Sexual Activity     Alcohol use: Yes     Alcohol/week: 6.0 oz     Types: 10 Cans of beer per week     Drug use: No     Sexual activity: Yes     Partners: Female   Other Topics Concern     Not on file   Social History Narrative     Not on file         Review of Systems  Please see form B           Objective:     Vitals:    11/20/18 1032   BP: 120/74   Pulse: 64   Weight: 188 lb 4.8 oz (85.4 kg)   Height: 5' 10.5\" (1.791 m)       Physical  General Appearance: Alert, cooperative, no distress, appears stated age  Head: Normocephalic, without obvious abnormality, atraumatic  Eyes: PERRL, fundi not observed, EOM's intact  Ears: Normal TM's and external ear canals bilateral  Nose: No abnormalities noted  Mouth and Throat: Normal appearance   Neck: Supple, symmetrical, trachea midline, no adenopathy or thyromegally,  no tenderness/mass/nodules; no carotid bruit or JVD  Back: no CVA tenderness or spinous process pain  Lungs: Clear to auscultation bilaterally, good air movent  Heart: Regular rate and rhythm, S1 and S2 normal, no murmur, rub, or gallop,  Abdomen: Soft, non-tender, no masses, no organomegaly  Genitourinary:.  No hernias   Rectal exam:    Musculoskeletal: No gross abnormalities    Extremities: Extremities normal, atraumatic, no cyanosis or edema, pulses 2/4 and symmetric  Skin:  no  worrisome lesions noted  Lymph nodes: " Cervical, supraclavicular, groin, and axillary nodes normal  Neurologic: CN2-12 intact, normal sensation, DTRs 2/4 and symmetric.   Psychiatric:  normal mood and affect.      No current outpatient medications on file.     No current facility-administered medications for this visit.

## 2021-06-23 NOTE — PATIENT INSTRUCTIONS - HE
Congratulations on your weight loss.    I am glad that you are feeling better on your new diet.    Blood pressure and other vital signs look excellent.    Continue with your lighter alcohol consumption.    We will check a variety of labs today.  I will update you on the results via my chart.  If there is something concerning, we will call.    I will fill out your paperwork and fax it once your labs result.    Follow-up in 1 year for repeat physical.

## 2021-06-23 NOTE — PROGRESS NOTES
"Assessment:   1. Physical exam  Healthy adult male.  He recently completed a \"whole 30\" diet and wanted to complete his biometric screening for work to see if there is been a considerable difference in some of his cholesterol numbers, as well as A1c, etc.    2. Healthcare maintenance    - Lipid Profile  - Glycosylated Hemoglobin A1c  - Comprehensive Metabolic Panel  - HM2(CBC w/o Differential)    3. Screen for STD (sexually transmitted disease)  If he had an episode of infidelity a couple of months ago and is currently working on his relationship with his longtime girlfriend.  He would like STD testing today.    - Chlamydia trachomatis & Neisseria gonorrhoeae, Amplified Detection  - Syphilis Screen, [facility]  - Hepatitis C Antibody (Anti-HCV)  - HIV Antigen/Antibody Screening [facility]  - Hepatitis B Surface Antigen (HBsAG)    4. Acute Monoarticular Inflammation  Stable.  No issues.    5. Overweight (BMI 25.0-29.9)  He is lost about 9 pounds since I last saw him 3 months ago.    6. Podagra - left bunion joint gout, typically every June.  Stable.  No issues.         Plan:     Patient Instructions   Congratulations on your weight loss.    I am glad that you are feeling better on your new diet.    Blood pressure and other vital signs look excellent.    Continue with your lighter alcohol consumption.    We will check a variety of labs today.  I will update you on the results via my chart.  If there is something concerning, we will call.    I will fill out your paperwork and fax it once your labs result.    Follow-up in 1 year for repeat physical.      Subjective:     Here for physical exam. Chart reviewed       History reviewed. No pertinent past medical history.  History reviewed. No pertinent surgical history.  Patient has no known allergies.  Family History   Problem Relation Age of Onset     Hyperlipidemia Mother      No Medical Problems Father      No Medical Problems Brother      No Medical Problems Daughter      No " "Medical Problems Son      Social History     Socioeconomic History     Marital status:      Spouse name: Not on file     Number of children: Not on file     Years of education: Not on file     Highest education level: Not on file   Social Needs     Financial resource strain: Not on file     Food insecurity - worry: Not on file     Food insecurity - inability: Not on file     Transportation needs - medical: Not on file     Transportation needs - non-medical: Not on file   Occupational History     Not on file   Tobacco Use     Smoking status: Never Smoker     Smokeless tobacco: Never Used   Substance and Sexual Activity     Alcohol use: Yes     Alcohol/week: 6.0 oz     Types: 10 Cans of beer per week     Drug use: No     Sexual activity: Yes     Partners: Female   Other Topics Concern     Not on file   Social History Narrative     Not on file         Review of Systems  Please see form B           Objective:     Vitals:    01/30/19 1144   BP: 120/74   Pulse: 60   Weight: 181 lb 3.2 oz (82.2 kg)   Height: 5' 10.75\" (1.797 m)       Physical  General Appearance: Alert, cooperative, no distress, appears stated age  Head: Normocephalic, without obvious abnormality, atraumatic  Eyes: PERRL, fundi not observed, EOM's intact  Ears: Normal TM's and external ear canals bilateral  Nose: No abnormalities noted  Mouth and Throat: Normal appearance   Neck: Supple, symmetrical, trachea midline, no adenopathy or thyromegally,  no tenderness/mass/nodules; no carotid bruit or JVD  Back: no CVA tenderness or spinous process pain  Lungs: Clear to auscultation bilaterally, good air movent  Heart: Regular rate and rhythm, S1 and S2 normal, no murmur, rub, or gallop,  Abdomen: Soft, non-tender, no masses, no organomegaly  Genitourinary:.  No hernias   Rectal exam:    Musculoskeletal: No gross abnormalities    Extremities: Extremities normal, atraumatic, no cyanosis or edema, pulses 2/4 and symmetric  Skin:  no  worrisome lesions " noted  Lymph nodes: Cervical, supraclavicular, groin, and axillary nodes normal  Neurologic: CN2-12 intact, normal sensation, DTRs 2/4 and symmetric.   Psychiatric:  normal mood and affect.      Current Outpatient Medications   Medication Sig Dispense Refill     cholecalciferol, vitamin D3, (VITAMIN D3 ORAL) Take 1-2 tablets by mouth daily.       MULTIVITAMIN ORAL Take 1 tablet by mouth daily.       omega-3/dha/epa/fish oil (FISH OIL-OMEGA-3 FATTY ACIDS) 300-1,000 mg capsule Take 2 g by mouth daily.       No current facility-administered medications for this visit.

## 2021-06-27 ENCOUNTER — HEALTH MAINTENANCE LETTER (OUTPATIENT)
Age: 46
End: 2021-06-27

## 2021-06-30 NOTE — PROGRESS NOTES
Progress Notes by Jessica Rai DO at 2/8/2021  7:20 AM     Author: Jessica Rai DO Service: -- Author Type: Physician    Filed: 2/8/2021  8:23 AM Encounter Date: 2/8/2021 Status: Signed    : Jessica Rai DO (Physician)       MALE PREVENTATIVE EXAM    Assessment and Plan:     Patient has been advised of split billing requirements and indicates understanding: Yes    Problem List Items Addressed This Visit        [facility] Cardiac Problems    Hyperlipidemia with target LDL less than 70    Relevant Orders    Lipid Profile       Other    Overweight (BMI 25.0-29.9)    Relevant Orders    Glycosylated Hemoglobin A1c    Chronic gout without tophus, unspecified cause, unspecified site    Relevant Orders    Thyroid Stimulating Hormone (TSH)    Stage 3a chronic kidney disease    Relevant Orders    Thyroid Stimulating Hormone (TSH)    Basic Metabolic Panel    HM2(CBC w/o Differential)      Other Visit Diagnoses     Preventative health care    -  Primary    Screening for diabetes mellitus        Relevant Orders    Glycosylated Hemoglobin A1c    Routine screening for STI (sexually transmitted infection)        Relevant Orders    Chlamydia trachomatis & Neisseria gonorrhoeae, Amplified Detection    HIV Antigen/Antibody Screening [place]    Syphilis Screen, [place]    Need for Td vaccine        Relevant Orders    Td, Preservative Free (green label)        -Preventative care provided as noted below  -STI screening done today based on patient request  -Td given  -PHQ-9 Total Score: 0 (2/8/2021  8:00 AM)  -BMI 26.58-lifestyle counseling provided.  Patient is a very healthy lifestyle and works out regularly.  He goes to [employer].  -Chronic diseases include chronic gout and stage IIIa kidney disease.  Gout is mostly diet controlled with a flare once every year.  Blood pressure under good control and no chronic NSAID use  -Will get baseline BMP due to history of gout  -Positive history of  hyperlipidemia-we will get lipids today  -We will also screen for anemia given CKD  -Screen for diabetes and thyroid disease given BMI  -Does not drink alcohol, quit in December 2020.  Not a smoker.  -Is a  for [employer]-has paperwork that he needs to fill out for his biometrics.  Will fill it out once we have the blood work numbers.      STI screen:starting a new relationship, requesting STI testing. Ordered.         Next follow up:  Return in about 2 months (around 4/8/2021) for Establish Care .    Immunization Review  Adult Imm Review: Due today, orders placed  Patient is not a smoker.    I discussed the following with the patient:   Adult Healthy Living: Importance of regular exercise  Healthy nutrition  Getting adequate sleep  Stress management  Firearm safety  STI prevention    I have had an Advance Directives discussion with the patient.  Honoring choices paperwork provided for him.        Subjective:   Chief Complaint: Kem Almonte is an 45 y.o. male here for a preventative health visit.    Patient has been advised of split billing requirements and indicates understanding: Yes     HPI:      Patient would like to get STI testing done.  Is about to embark on any relationship with a new female partner.  Recently went through a break-up in December 2020.  That was the time that he stopped drinking altogether.    Other chronic additions for him include: Gout-uses indomethacin as needed.  Has had it for about 15 years.  Gets about 1 attack a year.  Is mostly diet controlled.    Occupation:  for [employer]  Home life: Lives by himself and has split custody of teenage children.  A daughter who is 17 years old and a son who is 15.  EtOH: None, quit December 2020  Smoking: None  Marijuana/vaping/illicit drug use: None    Healthy Habits  Are you taking a daily aspirin? No  Do you typically exercising at least 40 min, 3-4 times per week?  Yes  Do you usually eat at least 4 servings of fruit and  "vegetables a day, include whole grains and fiber and avoid regularly eating high fat foods? Yes  Have you had an eye exam in the past two years? Yes , August last   Do you see a dentist twice per year? Yes  Do you have any concerns regarding sleep? No    Safety Screen  If you own firearms, are they secured in a locked gun cabinet or with trigger locks? Yes, safely stowed.  Has a shotgun at home.  Do you feel you are safe where you are living?: Yes (2/8/2021  7:17 AM)  Do you feel you are safe in your relationship(s)?: Yes (2/8/2021  7:17 AM)      Review of Systems:  Please see above.  The rest of the review of systems are negative for all systems.     Cancer Screening     Patient has no health maintenance due at this time          Patient Care Team:  Henri Bolivar MD as PCP - General        History     Reviewed By Date/Time Sections Reviewed    Sayra Johnson CMA 2/8/2021  7:20 AM Tobacco    Sayra Johnson CMA 2/8/2021  7:17 AM Tobacco            Objective:   Vital Signs:   Visit Vitals  /80 (Patient Site: Right Arm, Patient Position: Sitting, Cuff Size: Adult Regular)   Pulse (!) 50   Temp 98.1  F (36.7  C) (Oral)   Ht 5' 10.5\" (1.791 m)   Wt 187 lb 14.4 oz (85.2 kg)   SpO2 100%   BMI 26.58 kg/m           Physical Exam  Constitutional:       General: He is not in acute distress.     Appearance: Normal appearance. He is normal weight. He is not ill-appearing or toxic-appearing.   HENT:      Head: Normocephalic and atraumatic.      Right Ear: Tympanic membrane and external ear normal. There is no impacted cerumen.      Left Ear: Tympanic membrane and external ear normal. There is no impacted cerumen.      Nose: Nose normal. No congestion or rhinorrhea.      Mouth/Throat:      Mouth: Mucous membranes are moist.      Pharynx: Oropharynx is clear. No oropharyngeal exudate.   Eyes:      General: No scleral icterus.        Right eye: No discharge.         Left eye: No discharge.      Extraocular " Movements: Extraocular movements intact.      Conjunctiva/sclera: Conjunctivae normal.      Pupils: Pupils are equal, round, and reactive to light.   Neck:      Musculoskeletal: Normal range of motion and neck supple. No neck rigidity or muscular tenderness.      Comments: No thyromegaly  Cardiovascular:      Rate and Rhythm: Normal rate and regular rhythm.      Pulses: Normal pulses.      Heart sounds: No murmur. No friction rub. No gallop.    Abdominal:      General: Abdomen is flat. Bowel sounds are normal. There is no distension.      Palpations: Abdomen is soft. There is no mass.      Tenderness: There is no abdominal tenderness. There is no right CVA tenderness, left CVA tenderness, guarding or rebound.      Hernia: No hernia is present.   Musculoskeletal: Normal range of motion.         General: No swelling, deformity or signs of injury.   Lymphadenopathy:      Cervical: No cervical adenopathy.   Skin:     General: Skin is warm and dry.      Coloration: Skin is not jaundiced or pale.      Findings: No bruising, erythema or rash.   Neurological:      General: No focal deficit present.      Mental Status: He is alert and oriented to person, place, and time. Mental status is at baseline.      Cranial Nerves: No cranial nerve deficit.      Motor: No weakness.      Coordination: Coordination normal.      Gait: Gait normal.   Psychiatric:         Mood and Affect: Mood normal.         Thought Content: Thought content normal.           The 10-year ASCVD risk score (Argyle IGNACIO Jr., et al., 2013) is: 2.2%    Values used to calculate the score:      Age: 45 years      Sex: Male      Is Non- : No      Diabetic: No      Tobacco smoker: No      Systolic Blood Pressure: 128 mmHg      Is BP treated: No      HDL Cholesterol: 69 mg/dL      Total Cholesterol: 231 mg/dL         Medication List          Accurate as of February 8, 2021  7:44 AM. If you have any questions, ask your nurse or doctor.             CONTINUE taking these medications    fish oil-omega-3 fatty acids 300-1,000 mg capsule  INSTRUCTIONS: Take 2 g by mouth daily.        ID NOW COVID-19 Test Kit Kit  INSTRUCTIONS: TEST AS DIRECTED  Generic drug: COVID-19 molecular test assay        indomethacin 25 MG capsule  Also known as: INDOCIN        MULTIVITAMIN ORAL  INSTRUCTIONS: Take 1 tablet by mouth daily.        VITAMIN D3 ORAL  INSTRUCTIONS: Take 1-2 tablets by mouth daily.               Additional Screenings Completed Today:

## 2021-07-03 NOTE — ADDENDUM NOTE
Addendum Note by Alem Couch CNP at 11/20/2018 10:30 AM     Author: Alem Couch CNP Service: -- Author Type: Nurse Practitioner    Filed: 11/20/2018 12:48 PM Encounter Date: 11/20/2018 Status: Signed    : Alem Couch CNP (Nurse Practitioner)    Addended by: ALEM COUCH on: 11/20/2018 12:48 PM        Modules accepted: Orders

## 2021-07-14 PROBLEM — N18.31 STAGE 3A CHRONIC KIDNEY DISEASE (H): Status: RESOLVED | Noted: 2021-02-08 | Resolved: 2021-02-24

## 2021-10-17 ENCOUNTER — HEALTH MAINTENANCE LETTER (OUTPATIENT)
Age: 46
End: 2021-10-17

## 2022-04-03 ENCOUNTER — HEALTH MAINTENANCE LETTER (OUTPATIENT)
Age: 47
End: 2022-04-03

## 2022-04-19 NOTE — PROGRESS NOTES
SUBJECTIVE:   CC: Kem Almonte is an 47 year old male who presents for preventative health visit.       Patient has been advised of split billing requirements and indicates understanding: Yes     Healthy Habits:     Getting at least 3 servings of Calcium per day:  Yes    Bi-annual eye exam:  Yes    Dental care twice a year:  Yes    Sleep apnea or symptoms of sleep apnea:  Daytime drowsiness    Diet:  Carbohydrate counting    Frequency of exercise:  4-5 days/week    Duration of exercise:  45-60 minutes    Taking medications regularly:  Yes    PHQ-2 Total Score: 0    Additional concerns today:  No      Today's PHQ-2 Score:   PHQ-2 ( 1999 Pfizer) 4/18/2022   Q1: Little interest or pleasure in doing things 0   Q2: Feeling down, depressed or hopeless 0   PHQ-2 Score 0   Q1: Little interest or pleasure in doing things Not at all   Q2: Feeling down, depressed or hopeless Not at all   PHQ-2 Score 0       Abuse: Current or Past(Physical, Sexual or Emotional)- No  Do you feel safe in your environment? Yes        Social History     Tobacco Use     Smoking status: Never Smoker     Smokeless tobacco: Never Used   Substance Use Topics     Alcohol use: Yes     Alcohol/week: 10.0 standard drinks     If you drink alcohol do you typically have >3 drinks per day or >7 drinks per week? Yes      Alcohol Use 4/20/2022   Prescreen: >3 drinks/day or >7 drinks/week? -   Prescreen: >3 drinks/day or >7 drinks/week?    AUDIT SCORE  -     AUDIT - Alcohol Use Disorders Identification Test - Reproduced from the World Health Organization Audit 2001 (Second Edition) 4/18/2022   1.  How often do you have a drink containing alcohol? 2 to 3 times a week   2.  How many drinks containing alcohol do you have on a typical day when you are drinking? 5 or 6   3.  How often do you have five or more drinks on one occasion? Weekly   4.  How often during the last year have you found that you were not able to stop drinking once you had started? Never    5.  How often during the last year have you failed to do what was normally expected of you because of drinking? Never   6.  How often during the last year have you needed a first drink in the morning to get yourself going after a heavy drinking session? Never   7.  How often during the last year have you had a feeling of guilt or remorse after drinking? Monthly   8.  How often during the last year have you been unable to remember what happened the night before because of your drinking? Never   9.  Have you or someone else been injured because of your drinking? No   10. Has a relative, friend, doctor or other health care worker been concerned about your drinking or suggested you cut down? No   TOTAL SCORE 10       Last PSA: No results found for: PSA    Reviewed orders with patient. 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     Chronic gout without tophus, unspecified cause, unspecified site     Hyperlipidemia with target LDL less than 70     History reviewed. No pertinent surgical history.    Social History     Tobacco Use     Smoking status: Never Smoker     Smokeless tobacco: Never Used   Substance Use Topics     Alcohol use: Yes     Alcohol/week: 10.0 standard drinks     Family History   Problem Relation Age of Onset     Hyperlipidemia Mother      Alzheimer Disease Father      No Known Problems Brother      No Known Problems Daughter      No Known Problems Son          Current Outpatient Medications   Medication Sig Dispense Refill     cholecalciferol, vitamin D3, (VITAMIN D3  "ORAL) [CHOLECALCIFEROL, VITAMIN D3, (VITAMIN D3 ORAL)] Take 1-2 tablets by mouth daily.       MULTIVITAMIN ORAL [MULTIVITAMIN ORAL] Take 1 tablet by mouth daily.       omega-3/dha/epa/fish oil (FISH OIL-OMEGA-3 FATTY ACIDS) 300-1,000 mg capsule [OMEGA-3/DHA/EPA/FISH OIL (FISH OIL-OMEGA-3 FATTY ACIDS) 300-1,000 MG CAPSULE] Take 2 g by mouth daily.       indomethacin (INDOCIN) 25 MG capsule [INDOMETHACIN (INDOCIN) 25 MG CAPSULE]  (Patient not taking: Reported on 4/20/2022)       No Known Allergies    Reviewed and updated as needed this visit by clinical staff   Tobacco  Allergies  Meds   Med Hx  Surg Hx  Fam Hx            Reviewed and updated as needed this visit by Provider       Med Hx  Surg Hx  Fam Hx           History reviewed. No pertinent past medical history.   History reviewed. No pertinent surgical history.    Review of Systems  CONSTITUTIONAL: NEGATIVE for fever, chills, change in weight  INTEGUMENTARY/SKIN: NEGATIVE for worrisome rashes, moles or lesions  EYES: NEGATIVE for vision changes or irritation  ENT: NEGATIVE for ear, mouth and throat problems  RESP: NEGATIVE for significant cough or SOB  CV: NEGATIVE for chest pain, palpitations or peripheral edema  GI: NEGATIVE for nausea, abdominal pain, heartburn, or change in bowel habits   male: negative for dysuria, hematuria, decreased urinary stream, erectile dysfunction, urethral discharge  MUSCULOSKELETAL: NEGATIVE for significant arthralgias or myalgia  NEURO: NEGATIVE for weakness, dizziness or paresthesias  PSYCHIATRIC: NEGATIVE for changes in mood or affect    OBJECTIVE:   /80 (BP Location: Right arm, Patient Position: Sitting, Cuff Size: Adult Regular)   Pulse 53   Temp 98.6  F (37  C) (Oral)   Resp 20   Ht 1.791 m (5' 10.5\")   Wt 85.5 kg (188 lb 8 oz)   SpO2 100%   BMI 26.66 kg/m      Physical Exam  GENERAL: healthy, alert and no distress  EYES: Eyes grossly normal to inspection, PERRL and conjunctivae and sclerae " "normal  HENT: ear canals and TM's normal, nose and mouth without ulcers or lesions  NECK: no adenopathy, no asymmetry, masses, or scars and thyroid normal to palpation  RESP: lungs clear to auscultation - no rales, rhonchi or wheezes  CV: regular rate and rhythm, normal S1 S2, no S3 or S4, no murmur, click or rub, no peripheral edema and peripheral pulses strong  ABDOMEN: soft, nontender, no hepatosplenomegaly, no masses and bowel sounds normal  MS: no gross musculoskeletal defects noted, no edema  SKIN: no suspicious lesions or rashes  NEURO: Normal strength and tone, mentation intact and speech normal  PSYCH: mentation appears normal, affect normal/bright    ASSESSMENT/PLAN:       ICD-10-CM    1. Routine general medical examination at a health care facility  Z00.00 COVID-19,PF,PFIZER (12+ YRS)     TSH with free T4 reflex     Lipid Profile (Chol, Trig, HDL, LDL calc)     CBC with platelets     Basic metabolic panel  (Ca, Cl, CO2, Creat, Gluc, K, Na, BUN)   2. Screen for colon cancer  Z12.11 Adult Gastro Ref - Procedure Only     HIV Antigen Antibody Combo   3. Erectile dysfunction, unspecified erectile dysfunction type  N52.9    4. Anxiety  F41.9    5. Routine screening for STI (sexually transmitted infection)  Z11.3 Neisseria gonorrhoeae PCR     Chlamydia trachomatis PCR     HIV Antigen Antibody Combo     Treponema Abs w Reflex to RPR and Titer     Hepatitis C antibody     Chlamydia trachomatis PCR   6. Alcohol use  Z72.89          Has been about a year since we last saw him    Patient doing well.  No significant health changes that he reports.  BMI 26.66-works out regularly and has a healthy lifestyle.  Still going to [employer].  Is 47 and qualifies for colonoscopy-ordered today.  No significant family history of cancer.  No new urinary symptoms.  Recently broke up with his girlfriend earlier in the year and he was \"in a dark place\" but feels her mental health is stable now.  Was drinking a bit more when he was in " "the relationship but has been cutting down substantially on the amount of alcohol that he is drinking.  Advised to continue to cut down. Patient does not smoke.  Denies any marijuana use, e-cigarette use or other drug use.  Due for baseline blood work-ordered today.  UTD on vaccines except for the flu.  Is a  for [employer]. Due for COVid booster - given today.     Has history of gout- disease controlled and stable    Has history of CKD stage IIIa-we will get repeat BMP today    STI screening requested by patient - ordered today     Anxiety:  Patient does report that when he is in a relationship, he has a difficult time giving the partner space and has some abandonment issues.  Has been wanting to work on this issue a bit more intensively.  Wanted to know what his options were.  Discussed therapy vs medication options.  Patient is already doing some course work with his therapist and will hold on         Patient has been advised of split billing requirements and indicates understanding: Yes    COUNSELING:   Reviewed preventive health counseling, as reflected in patient instructions    Estimated body mass index is 26.66 kg/m  as calculated from the following:    Height as of this encounter: 1.791 m (5' 10.5\").    Weight as of this encounter: 85.5 kg (188 lb 8 oz).     Weight management plan: Discussed healthy diet and exercise guidelines    He reports that he has never smoked. He has never used smokeless tobacco.      Jessica Rai DO  [facility]  "

## 2022-04-19 NOTE — PROGRESS NOTES
PHYSICAL***47/M  SR 2/24/21    PHYSICAL DUE  INFLUENZA, COVID PF BOOSTER DUE    COLONOSCOPY DUE- NA

## 2022-04-20 ENCOUNTER — OFFICE VISIT (OUTPATIENT)
Dept: FAMILY MEDICINE | Facility: CLINIC | Age: 47
End: 2022-04-20
Payer: COMMERCIAL

## 2022-04-20 VITALS
SYSTOLIC BLOOD PRESSURE: 112 MMHG | BODY MASS INDEX: 26.39 KG/M2 | HEART RATE: 53 BPM | TEMPERATURE: 98.6 F | WEIGHT: 188.5 LBS | RESPIRATION RATE: 20 BRPM | OXYGEN SATURATION: 100 % | DIASTOLIC BLOOD PRESSURE: 80 MMHG | HEIGHT: 71 IN

## 2022-04-20 DIAGNOSIS — Z12.11 SCREEN FOR COLON CANCER: ICD-10-CM

## 2022-04-20 DIAGNOSIS — F41.9 ANXIETY: ICD-10-CM

## 2022-04-20 DIAGNOSIS — Z78.9 ALCOHOL USE: ICD-10-CM

## 2022-04-20 DIAGNOSIS — N52.9 ERECTILE DYSFUNCTION, UNSPECIFIED ERECTILE DYSFUNCTION TYPE: ICD-10-CM

## 2022-04-20 DIAGNOSIS — Z00.00 ROUTINE GENERAL MEDICAL EXAMINATION AT A HEALTH CARE FACILITY: Primary | ICD-10-CM

## 2022-04-20 DIAGNOSIS — Z11.3 ROUTINE SCREENING FOR STI (SEXUALLY TRANSMITTED INFECTION): ICD-10-CM

## 2022-04-20 LAB
ANION GAP SERPL CALCULATED.3IONS-SCNC: 9 MMOL/L (ref 5–18)
BUN SERPL-MCNC: 13 MG/DL (ref 8–22)
CALCIUM SERPL-MCNC: 9.7 MG/DL (ref 8.5–10.5)
CHLORIDE BLD-SCNC: 105 MMOL/L (ref 98–107)
CHOLEST SERPL-MCNC: 236 MG/DL
CO2 SERPL-SCNC: 26 MMOL/L (ref 22–31)
CREAT SERPL-MCNC: 0.99 MG/DL (ref 0.7–1.3)
ERYTHROCYTE [DISTWIDTH] IN BLOOD BY AUTOMATED COUNT: 12.1 % (ref 10–15)
FASTING STATUS PATIENT QL REPORTED: YES
GFR SERPL CREATININE-BSD FRML MDRD: >90 ML/MIN/1.73M2
GLUCOSE BLD-MCNC: 100 MG/DL (ref 70–125)
HCT VFR BLD AUTO: 42.9 % (ref 40–53)
HDLC SERPL-MCNC: 75 MG/DL
HGB BLD-MCNC: 14.7 G/DL (ref 13.3–17.7)
HIV 1+2 AB+HIV1 P24 AG SERPL QL IA: NEGATIVE
LDLC SERPL CALC-MCNC: 154 MG/DL
MCH RBC QN AUTO: 32.2 PG (ref 26.5–33)
MCHC RBC AUTO-ENTMCNC: 34.3 G/DL (ref 31.5–36.5)
MCV RBC AUTO: 94 FL (ref 78–100)
PLATELET # BLD AUTO: 236 10E3/UL (ref 150–450)
POTASSIUM BLD-SCNC: 4.7 MMOL/L (ref 3.5–5)
RBC # BLD AUTO: 4.56 10E6/UL (ref 4.4–5.9)
SODIUM SERPL-SCNC: 140 MMOL/L (ref 136–145)
TRIGL SERPL-MCNC: 36 MG/DL
TSH SERPL DL<=0.005 MIU/L-ACNC: 1.02 UIU/ML (ref 0.3–5)
WBC # BLD AUTO: 8 10E3/UL (ref 4–11)

## 2022-04-20 PROCEDURE — 84443 ASSAY THYROID STIM HORMONE: CPT | Performed by: STUDENT IN AN ORGANIZED HEALTH CARE EDUCATION/TRAINING PROGRAM

## 2022-04-20 PROCEDURE — 86780 TREPONEMA PALLIDUM: CPT | Performed by: STUDENT IN AN ORGANIZED HEALTH CARE EDUCATION/TRAINING PROGRAM

## 2022-04-20 PROCEDURE — 80048 BASIC METABOLIC PNL TOTAL CA: CPT | Performed by: STUDENT IN AN ORGANIZED HEALTH CARE EDUCATION/TRAINING PROGRAM

## 2022-04-20 PROCEDURE — 91305 COVID-19,PF,PFIZER (12+ YRS): CPT | Performed by: STUDENT IN AN ORGANIZED HEALTH CARE EDUCATION/TRAINING PROGRAM

## 2022-04-20 PROCEDURE — 86803 HEPATITIS C AB TEST: CPT | Performed by: STUDENT IN AN ORGANIZED HEALTH CARE EDUCATION/TRAINING PROGRAM

## 2022-04-20 PROCEDURE — 80061 LIPID PANEL: CPT | Performed by: STUDENT IN AN ORGANIZED HEALTH CARE EDUCATION/TRAINING PROGRAM

## 2022-04-20 PROCEDURE — 87389 HIV-1 AG W/HIV-1&-2 AB AG IA: CPT | Performed by: STUDENT IN AN ORGANIZED HEALTH CARE EDUCATION/TRAINING PROGRAM

## 2022-04-20 PROCEDURE — 87491 CHLMYD TRACH DNA AMP PROBE: CPT | Performed by: STUDENT IN AN ORGANIZED HEALTH CARE EDUCATION/TRAINING PROGRAM

## 2022-04-20 PROCEDURE — 87591 N.GONORRHOEAE DNA AMP PROB: CPT | Performed by: STUDENT IN AN ORGANIZED HEALTH CARE EDUCATION/TRAINING PROGRAM

## 2022-04-20 PROCEDURE — 36415 COLL VENOUS BLD VENIPUNCTURE: CPT | Performed by: STUDENT IN AN ORGANIZED HEALTH CARE EDUCATION/TRAINING PROGRAM

## 2022-04-20 PROCEDURE — 0054A COVID-19,PF,PFIZER (12+ YRS): CPT | Performed by: STUDENT IN AN ORGANIZED HEALTH CARE EDUCATION/TRAINING PROGRAM

## 2022-04-20 PROCEDURE — 85027 COMPLETE CBC AUTOMATED: CPT | Performed by: STUDENT IN AN ORGANIZED HEALTH CARE EDUCATION/TRAINING PROGRAM

## 2022-04-21 LAB
C TRACH DNA SPEC QL NAA+PROBE: NEGATIVE
HCV AB SERPL QL IA: NEGATIVE
N GONORRHOEA DNA SPEC QL NAA+PROBE: NEGATIVE
T PALLIDUM AB SER QL: NONREACTIVE

## 2022-04-22 ENCOUNTER — TELEPHONE (OUTPATIENT)
Dept: GASTROENTEROLOGY | Facility: CLINIC | Age: 47
End: 2022-04-22
Payer: COMMERCIAL

## 2022-04-22 NOTE — TELEPHONE ENCOUNTER
Screening Questions  BlueKIND OF PREP RedLOCATION [review exclusion criteria] GreenSEDATION TYPE  1. Have you had a positive covid test in the last 90 days? N     2. Do you have a legal guardian or medical Power of ?  Are you able to give consent for your medical care?Y (Sedation review/consideration needed)    3. Are you active on mychart? Y    4. What insurance is in the chart? [facility]      3.   Ordering/Referring Provider: ANGIE     4. BMI 27.0 [BMI OVER 40-EXTENDED PREP]  If greater than 40 review exclusion criteria [PAC APPT IF @ [facility]]        5.  Respiratory Screening :  [If yes to any of the following HOSPITAL setting only]     Do you use daily home oxygen? N    Do you have mod to severe Obstructive Sleep Apnea? N  [OKAY @ [facility] SH PH RI]   Do you have Pulmonary Hypertension? N     Do you have UNCONTROLLED asthma? N        6.   Have you had a heart or lung transplant? N      7.   Are you currently on dialysis? N [ If yes, G-PREP & HOSPITAL setting only]     8.   Do you have chronic kidney disease? N [ If yes, G-PREP ]    9.   Have you had a stroke or Transient ischemic attack (TIA - aka  mini stroke ) within 6 months?  N (If yes, please review exclusion criteria)    10.   In the past 6 months, have you had any heart related issues including cardiomyopathy or heart attack? N           If yes, did it require cardiac stenting or other implantable device? N      11.   Do you have any implantable devices in your body (pacemaker, defib, LVAD)? N (If yes, please review exclusion criteria)    12.   Do you take nitroglycerin? N           If yes, how often? N  (if yes, HOSPITAL setting ONLY)    13.   Are you currently taking any blood thinners? N           [IF YES, INFORM PATIENT TO FOLLOW UP W/ ORDERING PROVIDER FOR BRIDGING INSTRUCTIONS]     14.   Do you have a diagnosis of diabetes? N   [ If yes, G-PREP ]    15.   [FEMALES] Are you currently pregnant? NA    If yes, how many weeks?  NA    16.   Are you taking any prescription pain medications on a routine schedule?  N  [ If yes, EXTENDED PREP.] [If yes, MAC]    17.   Do you have any chemical dependencies such as alcohol, street drugs, or methadone?  N [If yes, MAC]    18.   Do you have any history of post-traumatic stress syndrome, severe anxiety or history of psychosis?  N  [If yes, MAC]    19.   Do you transfer independently?  Y    20.  On a regular basis do you go 3-5 days between bowel movements? N   [ If yes, EXTENDED PREP.]    21.   Preferred LOCAL Pharmacy for Pre Prescription   [id number] - [address]      Scheduling Details      Caller : VAUGHN  (Please ask for phone number if not scheduled by patient)    Type of Procedure Scheduled: COLON  Which Colonoscopy Prep was Sent?: LISSA JAIN CF PATIENTS & GROJOHANN'S PATIENTS NEEDS EXTENDED PREP  Surgeon: YVON  Date of Procedure: 6/8  Location: [facility]      Sedation Type: MAC  Conscious Sedation- Needs  for 6 hours after the procedure  MAC/General-Needs  for 24 hours after procedure    Pre-op Required at [facility], [place], [place] and OR for MAC sedation: N  (advise patient they will need a pre-op prior to procedure -)      Informed patient they will need an adult  Y  Cannot take any type of public or medical transportation alone    [facility]Pro[facility] or Externally: Y    Confirmed Nurse will call to complete assessment Y    Additional comments: N

## 2022-04-24 PROBLEM — F10.90 ALCOHOL USE: Status: ACTIVE | Noted: 2022-04-24

## 2022-04-24 PROBLEM — F41.9 ANXIETY: Status: ACTIVE | Noted: 2022-04-24

## 2022-04-24 PROBLEM — Z78.9 ALCOHOL USE: Status: ACTIVE | Noted: 2022-04-24

## 2022-04-24 PROBLEM — N52.9 ERECTILE DYSFUNCTION, UNSPECIFIED ERECTILE DYSFUNCTION TYPE: Status: ACTIVE | Noted: 2022-04-24

## 2022-05-25 ENCOUNTER — TELEPHONE (OUTPATIENT)
Dept: GASTROENTEROLOGY | Facility: CLINIC | Age: 47
End: 2022-05-25

## 2022-05-25 NOTE — TELEPHONE ENCOUNTER
Attempted to contact patient regarding upcoming colonoscopy procedure on 6.8.2022 for pre assessment questions. No answer.     Left message to return call to [phone] #[phone]    Covid test? COVID scheduling number left on .    Arrival time: 0930    Facility location: [facility]    Sedation type: MAC    Indication for procedure: screening colonoscopy    Referring provider: Jessica Rai DO    Bowel prep recommendation: Miralax/Magnesium citrate/Dulcolax    Daisy Kaufman RN

## 2022-05-26 DIAGNOSIS — Z11.59 ENCOUNTER FOR SCREENING FOR OTHER VIRAL DISEASES: Primary | ICD-10-CM

## 2022-05-27 ENCOUNTER — TELEPHONE (OUTPATIENT)
Dept: GASTROENTEROLOGY | Facility: CLINIC | Age: 47
End: 2022-05-27
Payer: COMMERCIAL

## 2022-05-27 NOTE — TELEPHONE ENCOUNTER
Caller: eKm Almonte      Procedure: COLONOSCOPY     Date, Location, and Surgeon of Procedure Cancelled: 06/08/2022 - [facility]     Ordering Provider: ANGIE SIMMONS     Reason for cancel (please be detailed, any staff messages or encounters to note?):     PER PT -- CHANGE DATE         Rescheduled: YES      If rescheduled:    Date: 08/02/2022   Location: [facility]   Prep Resent: YES (changes to prep?)   Covid Test Rescheduled: YES    Note any change or update to original order/sedation: N/A

## 2022-07-27 ENCOUNTER — TELEPHONE (OUTPATIENT)
Dept: GASTROENTEROLOGY | Facility: CLINIC | Age: 47
End: 2022-07-27

## 2022-07-27 DIAGNOSIS — Z12.11 ENCOUNTER FOR SCREENING COLONOSCOPY: Primary | ICD-10-CM

## 2022-07-27 RX ORDER — BISACODYL 5 MG/1
TABLET, DELAYED RELEASE ORAL
Qty: 4 TABLET | Refills: 0 | Status: SHIPPED | OUTPATIENT
Start: 2022-07-27 | End: 2022-10-11

## 2022-07-27 NOTE — TELEPHONE ENCOUNTER
Attempted to contact patient regarding upcoming colonoscopy procedure on 8.2.2022 for pre assessment questions. No answer.     Left message to return call to [phone] #3    Covid test scheduled 7.29.2022 Discuss at home rapid antigen COVID test 1-2 days prior to procedure.    Arrival time: 0800    Facility location: [facility]    Sedation type: MAC    Indication for procedure: screening colonoscopy    Bowel prep recommendation: Golytely d/t mag citrate recall    Prep instructions sent via Mysafeplace.  Prep prescription sent to [id number] - [address]    Daisy Kaufman RN

## 2022-07-29 NOTE — TELEPHONE ENCOUNTER
Pre assessment questions completed for upcoming colonoscopy  procedure scheduled on 8/2/22    COVID policy reviewed. Patient to complete rapid antigen test one to two days before their scheduled procedure. Patient to bring photo of the results when they come in for their procedure.    Reviewed procedural arrival time 0800 and facility location [facility].    Designated  policy reviewed. Instructed to have someone stay 24 hours post procedure.     Reviewed Golytely prep instructions with patient. No fiber/iron supplements or foods that contain nuts/seeds 7 days prior to procedure.     Patient verbalized understanding and had no questions or concerns at this time.    Rafia Dougherty RN

## 2022-08-02 ENCOUNTER — ANESTHESIA EVENT (OUTPATIENT)
Dept: SURGERY | Facility: AMBULATORY SURGERY CENTER | Age: 47
End: 2022-08-02
Payer: COMMERCIAL

## 2022-08-02 ENCOUNTER — ANESTHESIA (OUTPATIENT)
Dept: SURGERY | Facility: AMBULATORY SURGERY CENTER | Age: 47
End: 2022-08-02
Payer: COMMERCIAL

## 2022-08-02 ENCOUNTER — HOSPITAL ENCOUNTER (OUTPATIENT)
Facility: AMBULATORY SURGERY CENTER | Age: 47
Discharge: HOME | End: 2022-08-02
Attending: INTERNAL MEDICINE | Admitting: INTERNAL MEDICINE
Payer: COMMERCIAL

## 2022-08-02 VITALS
WEIGHT: 188 LBS | HEIGHT: 71 IN | BODY MASS INDEX: 26.32 KG/M2 | HEART RATE: 59 BPM | TEMPERATURE: 97.4 F | SYSTOLIC BLOOD PRESSURE: 117 MMHG | DIASTOLIC BLOOD PRESSURE: 51 MMHG | OXYGEN SATURATION: 99 % | RESPIRATION RATE: 18 BRPM

## 2022-08-02 VITALS — HEART RATE: 59 BPM

## 2022-08-02 LAB — COLONOSCOPY: NORMAL

## 2022-08-02 PROCEDURE — 88305 TISSUE EXAM BY PATHOLOGIST: CPT | Mod: TC | Performed by: INTERNAL MEDICINE

## 2022-08-02 PROCEDURE — 45380 COLONOSCOPY AND BIOPSY: CPT | Mod: 33

## 2022-08-02 RX ORDER — LIDOCAINE 40 MG/G
CREAM TOPICAL
Status: DISCONTINUED | OUTPATIENT
Start: 2022-08-02 | End: 2022-08-02 | Stop reason: HOSPADM

## 2022-08-02 RX ORDER — ONDANSETRON 2 MG/ML
4 INJECTION INTRAMUSCULAR; INTRAVENOUS
Status: DISCONTINUED | OUTPATIENT
Start: 2022-08-02 | End: 2022-08-02 | Stop reason: HOSPADM

## 2022-08-02 RX ORDER — PROPOFOL 10 MG/ML
INJECTION, EMULSION INTRAVENOUS CONTINUOUS PRN
Status: DISCONTINUED | OUTPATIENT
Start: 2022-08-02 | End: 2022-08-02

## 2022-08-02 RX ORDER — NALOXONE HYDROCHLORIDE 0.4 MG/ML
0.2 INJECTION, SOLUTION INTRAMUSCULAR; INTRAVENOUS; SUBCUTANEOUS
Status: DISCONTINUED | OUTPATIENT
Start: 2022-08-02 | End: 2022-08-03 | Stop reason: HOSPADM

## 2022-08-02 RX ORDER — ONDANSETRON 2 MG/ML
4 INJECTION INTRAMUSCULAR; INTRAVENOUS EVERY 6 HOURS PRN
Status: DISCONTINUED | OUTPATIENT
Start: 2022-08-02 | End: 2022-08-03 | Stop reason: HOSPADM

## 2022-08-02 RX ORDER — NALOXONE HYDROCHLORIDE 0.4 MG/ML
0.4 INJECTION, SOLUTION INTRAMUSCULAR; INTRAVENOUS; SUBCUTANEOUS
Status: DISCONTINUED | OUTPATIENT
Start: 2022-08-02 | End: 2022-08-03 | Stop reason: HOSPADM

## 2022-08-02 RX ORDER — ONDANSETRON 4 MG/1
4 TABLET, ORALLY DISINTEGRATING ORAL EVERY 6 HOURS PRN
Status: DISCONTINUED | OUTPATIENT
Start: 2022-08-02 | End: 2022-08-03 | Stop reason: HOSPADM

## 2022-08-02 RX ORDER — FLUMAZENIL 0.1 MG/ML
0.2 INJECTION, SOLUTION INTRAVENOUS
Status: ACTIVE | OUTPATIENT
Start: 2022-08-02 | End: 2022-08-02

## 2022-08-02 RX ORDER — LIDOCAINE HYDROCHLORIDE 20 MG/ML
INJECTION, SOLUTION INFILTRATION; PERINEURAL PRN
Status: DISCONTINUED | OUTPATIENT
Start: 2022-08-02 | End: 2022-08-02

## 2022-08-02 RX ORDER — PROPOFOL 10 MG/ML
INJECTION, EMULSION INTRAVENOUS PRN
Status: DISCONTINUED | OUTPATIENT
Start: 2022-08-02 | End: 2022-08-02

## 2022-08-02 RX ORDER — PROCHLORPERAZINE MALEATE 10 MG
10 TABLET ORAL EVERY 6 HOURS PRN
Status: DISCONTINUED | OUTPATIENT
Start: 2022-08-02 | End: 2022-08-03 | Stop reason: HOSPADM

## 2022-08-02 RX ORDER — SODIUM CHLORIDE, SODIUM LACTATE, POTASSIUM CHLORIDE, CALCIUM CHLORIDE 600; 310; 30; 20 MG/100ML; MG/100ML; MG/100ML; MG/100ML
500 INJECTION, SOLUTION INTRAVENOUS CONTINUOUS
Status: DISCONTINUED | OUTPATIENT
Start: 2022-08-02 | End: 2022-08-02 | Stop reason: HOSPADM

## 2022-08-02 RX ADMIN — LIDOCAINE HYDROCHLORIDE 60 MG: 20 INJECTION, SOLUTION INFILTRATION; PERINEURAL at 08:41

## 2022-08-02 RX ADMIN — PROPOFOL 50 MG: 10 INJECTION, EMULSION INTRAVENOUS at 08:44

## 2022-08-02 RX ADMIN — SODIUM CHLORIDE, SODIUM LACTATE, POTASSIUM CHLORIDE, CALCIUM CHLORIDE 500 ML: 600; 310; 30; 20 INJECTION, SOLUTION INTRAVENOUS at 08:34

## 2022-08-02 RX ADMIN — PROPOFOL 50 MG: 10 INJECTION, EMULSION INTRAVENOUS at 08:41

## 2022-08-02 RX ADMIN — PROPOFOL 200 MCG/KG/MIN: 10 INJECTION, EMULSION INTRAVENOUS at 08:44

## 2022-08-02 NOTE — ANESTHESIA POSTPROCEDURE EVALUATION
Patient: Kem Almonte    Procedure: Procedure(s):  COLONOSCOPY, WITH POLYPECTOMY       Anesthesia Type:  MAC    Note:  Disposition: Outpatient   Postop Pain Control: Uneventful            Sign Out: Well controlled pain   PONV: No   Neuro/Psych: Uneventful            Sign Out: Acceptable/Baseline neuro status   Airway/Respiratory: Uneventful            Sign Out: Acceptable/Baseline resp. status   CV/Hemodynamics: Uneventful            Sign Out: Acceptable CV status; No obvious hypovolemia; No obvious fluid overload   Other NRE: NONE   DID A NON-ROUTINE EVENT OCCUR? No           Last vitals:  Vitals Value Taken Time   /51 08/02/22 0930   Temp 36.3  C (97.4  F) 08/02/22 0930   Pulse 59 08/02/22 0930   Resp 18 08/02/22 0930   SpO2 99 % 08/02/22 0930       Electronically Signed By: Shaun Gil MD, MD  August 2, 2022  10:07 AM

## 2022-08-02 NOTE — ANESTHESIA CARE TRANSFER NOTE
Patient: Kem Almonte    Procedure: Procedure(s):  COLONOSCOPY, WITH POLYPECTOMY       Diagnosis: Screen for colon cancer [Z12.11]  Diagnosis Additional Information: No value filed.    Anesthesia Type:   MAC     Note:    Oropharynx: oropharynx clear of all foreign objects and spontaneously breathing  Level of Consciousness: awake  Oxygen Supplementation: room air    Independent Airway: airway patency satisfactory and stable  Dentition: dentition unchanged  Vital Signs Stable: post-procedure vital signs reviewed and stable  Report to RN Given: handoff report given  Patient transferred to: Phase II    Handoff Report: Identifed the Patient, Identified the Reponsible Provider, Reviewed the pertinent medical history, Discussed the surgical course, Reviewed Intra-OP anesthesia mangement and issues during anesthesia, Set expectations for post-procedure period and Allowed opportunity for questions and acknowledgement of understanding      Vitals:  Vitals Value Taken Time   /51 08/02/22 0930   Temp 36.3  C (97.4  F) 08/02/22 0930   Pulse 59 08/02/22 0930   Resp 18 08/02/22 0930   SpO2 99 % 08/02/22 0930       Electronically Signed By: TONI JOHNSTON  August 2, 2022  9:34 AM

## 2022-08-02 NOTE — H&P
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     Chronic gout without tophus, unspecified cause, unspecified site     Hyperlipidemia with target LDL less than 70     Erectile dysfunction, unspecified erectile dysfunction type     Anxiety     Alcohol use       Past Surgical History:  History reviewed. No pertinent surgical history.    Past Medical History: History reviewed. No pertinent past medical history.    Social History:   Social History     Tobacco Use     Smoking status: Never Smoker     Smokeless tobacco: Never Used   Substance Use Topics     Alcohol use: Yes     Alcohol/week: 10.0 standard drinks       Family History:   Family History   Problem Relation Age of Onset     Hyperlipidemia Mother      Alzheimer Disease Father      No Known Problems Brother      No Known Problems Daughter      No Known Problems Son        Allergies: No Known Allergies    Active Medications:   Current Outpatient Medications   Medication Sig Dispense Refill     cholecalciferol, vitamin D3, (VITAMIN D3 ORAL) [CHOLECALCIFEROL, VITAMIN D3, (VITAMIN D3 ORAL)] Take 1-2 tablets by mouth daily.       MULTIVITAMIN ORAL [MULTIVITAMIN ORAL] Take 1 tablet by mouth daily.       omega-3/dha/epa/fish oil (FISH OIL-OMEGA-3 FATTY ACIDS) 300-1,000 mg capsule [OMEGA-3/DHA/EPA/FISH OIL (FISH OIL-OMEGA-3 FATTY ACIDS) 300-1,000 MG CAPSULE] Take 2 g by mouth daily.       bisacodyl (DULCOLAX) 5 MG EC tablet Take as directed. One day before exam take 2 tablets at 3 PM. Take 2 tablets at 11 PM. 4 tablet 0     indomethacin (INDOCIN) 25 MG capsule [INDOMETHACIN (INDOCIN) 25 MG CAPSULE]  (Patient not taking: Reported on 4/20/2022)       polyethylene glycol (GOLYTELY) 236 g suspension Take as directed. One day before exam fill the jug with water. Cover and shake  "until well mixed. At 6 PM start drinking an 8oz glass of mixture every 15 minutes until jug is 1/2 empty. Store remainder in the refrigerator.  At 11 PM Start drinking the other half of the Golytely jug. Drink one 8-ounce glass every 15 minutes until the jug is empty. 4000 mL 0       Systemic Review:   CONSTITUTIONAL: NEGATIVE for fever, chills, change in weight  ENT/MOUTH: NEGATIVE for ear, mouth and throat problems  RESP: NEGATIVE for significant cough or SOB  CV: NEGATIVE for chest pain, palpitations or peripheral edema    Physical Examination:   Vital Signs: BP (!) 144/94   Pulse (!) 49   Temp 97.6  F (36.4  C) (Temporal)   Resp 16   Ht 1.791 m (5' 10.5\")   Wt 85.3 kg (188 lb)   SpO2 100%   BMI 26.59 kg/m    GENERAL: healthy, alert and no distress  NECK: no adenopathy, no asymmetry, masses, or scars  RESP: lungs clear to auscultation - no rales, rhonchi or wheezes  CV: regular rate and rhythm, normal S1 S2, no S3 or S4, no murmur, click or rub, no peripheral edema and peripheral pulses strong  ABDOMEN: soft, nontender, no hepatosplenomegaly, no masses and bowel sounds normal  MS: no gross musculoskeletal defects noted, no edema    Plan: Appropriate to proceed as scheduled.      Kris Silva MD  8/2/2022    PCP:  Henri Bolivar    "

## 2022-08-02 NOTE — ANESTHESIA PREPROCEDURE EVALUATION
Anesthesia Pre-Procedure Evaluation    Patient: Kem Almonte   MRN: [id number] : 1975        Procedure : Procedure(s):  COLONOSCOPY          History reviewed. No pertinent past medical history.   History reviewed. No pertinent surgical history.   No Known Allergies   Social History     Tobacco Use     Smoking status: Never Smoker     Smokeless tobacco: Never Used   Substance Use Topics     Alcohol use: Yes     Alcohol/week: 10.0 standard drinks      Wt Readings from Last 1 Encounters:   22 85.3 kg (188 lb)        Anesthesia Evaluation   Pt has not had prior anesthetic         ROS/MED HX  ENT/Pulmonary:  - neg pulmonary ROS     Neurologic:  - neg neurologic ROS     Cardiovascular:  - neg cardiovascular ROS     METS/Exercise Tolerance:     Hematologic:  - neg hematologic  ROS     Musculoskeletal:  - neg musculoskeletal ROS     GI/Hepatic:  - neg GI/hepatic ROS     Renal/Genitourinary:  - neg Renal ROS     Endo:  - neg endo ROS     Psychiatric/Substance Use:  - neg psychiatric ROS     Infectious Disease:  - neg infectious disease ROS     Malignancy:       Other:            Physical Exam    Airway  airway exam normal           Respiratory Devices and Support         Dental  no notable dental history         Cardiovascular   cardiovascular exam normal          Pulmonary   pulmonary exam normal                OUTSIDE LABS:  CBC:   Lab Results   Component Value Date    WBC 8.0 2022    WBC 6.5 2021    HGB 14.7 2022    HGB 15.2 2021    HCT 42.9 2022    HCT 44.3 2021     2022     2021     BMP:   Lab Results   Component Value Date     2022     2021    POTASSIUM 4.7 2022    POTASSIUM 4.8 2021    CHLORIDE 105 2022    CHLORIDE 103 2021    CO2 26 2022    CO2 27 2021    BUN 13 2022    BUN 11 2021    CR 0.99 2022    CR 1.07 2021     2022      02/08/2021     COAGS: No results found for: PTT, INR, FIBR  POC: No results found for: BGM, HCG, HCGS  HEPATIC:   Lab Results   Component Value Date    ALBUMIN 4.3 01/30/2020    PROTTOTAL 7.1 01/30/2020    ALT 21 01/30/2020    AST 24 01/30/2020    ALKPHOS 51 01/30/2020    BILITOTAL 0.7 01/30/2020     OTHER:   Lab Results   Component Value Date    A1C 5.1 02/08/2021    PHILLIP 9.7 04/20/2022    TSH 1.02 04/20/2022       Anesthesia Plan    ASA Status:  1   NPO Status:  NPO Appropriate    Anesthesia Type: MAC.     - Reason for MAC: straight local not clinically adequate   Induction: Intravenous.   Maintenance: TIVA.        Consents    Anesthesia Plan(s) and associated risks, benefits, and realistic alternatives discussed. Questions answered and patient/representative(s) expressed understanding.     - Discussed: Risks, Benefits and Alternatives for BOTH SEDATION and the PROCEDURE were discussed     - Discussed with:  Patient         Postoperative Care    Pain management: Oral pain medications.   PONV prophylaxis: Ondansetron (or other 5HT-3), Dexamethasone or Solumedrol     Comments:                Shaun Gil MD, MD

## 2022-08-03 LAB
PATH REPORT.COMMENTS IMP SPEC: NORMAL
PATH REPORT.COMMENTS IMP SPEC: NORMAL
PATH REPORT.FINAL DX SPEC: NORMAL
PATH REPORT.GROSS SPEC: NORMAL
PATH REPORT.MICROSCOPIC SPEC OTHER STN: NORMAL
PATH REPORT.RELEVANT HX SPEC: NORMAL
PHOTO IMAGE: NORMAL

## 2022-08-03 PROCEDURE — 88305 TISSUE EXAM BY PATHOLOGIST: CPT | Mod: 26 | Performed by: PATHOLOGY

## 2022-09-23 ENCOUNTER — VIRTUAL VISIT (OUTPATIENT)
Dept: FAMILY MEDICINE | Facility: CLINIC | Age: 47
End: 2022-09-23
Payer: COMMERCIAL

## 2022-09-23 ENCOUNTER — LAB (OUTPATIENT)
Dept: FAMILY MEDICINE | Facility: CLINIC | Age: 47
End: 2022-09-23
Attending: SURGERY
Payer: COMMERCIAL

## 2022-09-23 ENCOUNTER — MYC MEDICAL ADVICE (OUTPATIENT)
Dept: FAMILY MEDICINE | Facility: CLINIC | Age: 47
End: 2022-09-23

## 2022-09-23 DIAGNOSIS — R50.9 FEVER, UNSPECIFIED FEVER CAUSE: Primary | ICD-10-CM

## 2022-09-23 DIAGNOSIS — R52 BODY ACHES: ICD-10-CM

## 2022-09-23 DIAGNOSIS — Z11.3 SCREEN FOR STD (SEXUALLY TRANSMITTED DISEASE): ICD-10-CM

## 2022-09-23 DIAGNOSIS — R50.9 FEVER, UNSPECIFIED FEVER CAUSE: ICD-10-CM

## 2022-09-23 LAB
FLUAV AG SPEC QL IA: NEGATIVE
FLUBV AG SPEC QL IA: NEGATIVE
SARS-COV-2 RNA RESP QL NAA+PROBE: NEGATIVE

## 2022-09-23 PROCEDURE — U0005 INFEC AGEN DETEC AMPLI PROBE: HCPCS

## 2022-09-23 PROCEDURE — 87804 INFLUENZA ASSAY W/OPTIC: CPT

## 2022-09-23 PROCEDURE — 99213 OFFICE O/P EST LOW 20 MIN: CPT | Mod: GT | Performed by: FAMILY MEDICINE

## 2022-09-23 PROCEDURE — U0003 INFECTIOUS AGENT DETECTION BY NUCLEIC ACID (DNA OR RNA); SEVERE ACUTE RESPIRATORY SYNDROME CORONAVIRUS 2 (SARS-COV-2) (CORONAVIRUS DISEASE [COVID-19]), AMPLIFIED PROBE TECHNIQUE, MAKING USE OF HIGH THROUGHPUT TECHNOLOGIES AS DESCRIBED BY CMS-2020-01-R: HCPCS

## 2022-09-23 NOTE — TELEPHONE ENCOUNTER
Dr. Mario Dyer-    See [facility]-   Had virtual visit with pt today, awaiting COVID result. Patient has fever 104.1, please advise     Ioana WHITLEY RN, BSN, PHN  [facility]

## 2022-09-23 NOTE — TELEPHONE ENCOUNTER
Pt stated that this temperature was without tylenol/ibuprofen. Patient stated he took some ibuprofen and it is temp has started to come down, is now 102.9    Patient is drinking electrolytes.    Advised patient to follow Dr. Mario Dyer's recommendations. Advised patient that if fever is not responding to tylenol/ibuprofen to go into UC or ED per recommendation.     Patient verbalized understanding.     Ioana WHITLEY RN, BSN, PHN  [facility]

## 2022-09-23 NOTE — PROGRESS NOTES
Khanh is a 47 year old who is being evaluated via a billable video visit.      How would you like to obtain your AVS? MyChart  If the video visit is dropped, the invitation should be resent by: Text to cell phone: [phone]  Will anyone else be joining your video visit? No          Assessment & Plan     Fever, unspecified fever cause  Patient has had three home COVID tests that were negative, but we will do the PCR test to be certain. Will also do influenza as we have seen cases already this year, though this is less likely without respiratory symptoms.  Recommended symptomatic cares and follow up next week if not improving.  - Symptomatic; Yes; 9/19/2022 COVID-19 Virus (Coronavirus) by PCR; Future  - Influenza A/B antigen; Future    Body aches  As above  - Symptomatic; Yes; 9/19/2022 COVID-19 Virus (Coronavirus) by PCR; Future  - Influenza A/B antigen; Future    Screen for STD (sexually transmitted disease)  - GC/Chlamydia by PCR - [facility]; Future  - HIV Antigen Antibody Combo; Future  - Treponema Abs w Reflex to RPR and Titer; Future      16 minutes spent on the date of the encounter doing chart review, history and exam, documentation and further activities per the note       See Patient Instructions    No follow-ups on file.    Dia Dyer MD  [facility]   Khanh is a 47 year old, presenting for the following health issues:  Covid Concern      HPI       COVID-19 Symptom Review  How many days ago did these symptoms start? 4 days     Are any of the following symptoms significant for you?    New or worsening difficulty breathing? No    Worsening cough? No    Fever or chills? Yes, the highest temperature was 101.9    Headache: YES    Sore throat: YES- very slight     Chest pain: No    Diarrhea: No    Body aches? YES    What treatments has patient tried? Ibuprofen, acetaminophen    Does patient live in a nursing home, group home, or shelter? No  Does patient have a way  to get food/medications during quarantined? Yes, I have a friend or family member who can help me.    Monday body aches, COVID negative, worsening over the week, Wednesday was the worse, brain fog, fatigue. Mornings are tough, afternoons are a little bit better. Three COVID tests negative Taking Acetaminophen and Ibuprofen, working well, temp 101.9 this morning.  Has chills.  No coughing or SOB.    Would like to get STI testing if he is going to be getting other testing, for convenience sake.    Review of Systems   Constitutional, HEENT, cardiovascular, pulmonary, gi and gu systems are negative, except as otherwise noted.      Objective           Vitals:  No vitals were obtained today due to virtual visit.    Physical Exam   GENERAL: Healthy, alert and no distress  EYES: Eyes grossly normal to inspection.  No discharge or erythema, or obvious scleral/conjunctival abnormalities.  RESP: No audible wheeze, cough, or visible cyanosis.  No visible retractions or increased work of breathing.    SKIN: Visible skin clear. No significant rash, abnormal pigmentation or lesions.  NEURO: Cranial nerves grossly intact.  Mentation and speech appropriate for age.  PSYCH: Mentation appears normal, affect normal/bright, judgement and insight intact, normal speech and appearance well-groomed.            Video-Visit Details    Video Start Time: 9:15 am    Type of service:  Video Visit    Video End Time:9:29 AM    Originating Location (pt. Location): Home    Distant Location (provider location):  [facility] [place]     Platform used for Video Visit: DefenCall

## 2022-09-26 NOTE — TELEPHONE ENCOUNTER
Pt called to schedule appointment and would like to go [place], Pt stated that he will call back later this afternoon and schedule at [place].

## 2022-09-26 NOTE — TELEPHONE ENCOUNTER
See Mychart update message from this morning, pt has not been seen in clinic, routed to [facility], please review and advise, F2F visit?, route to inform pt of plan  Shari Kearney RN, BSN  [facility]

## 2022-09-26 NOTE — TELEPHONE ENCOUNTER
Pt has been informed, agrees, routed to [facility], please call pt and schedule F2F visit in next day or 2, confirm PCP, [facility], lives in [place], may want closer location  Shari Kearney RN, BSN  [facility]

## 2022-09-27 ENCOUNTER — OFFICE VISIT (OUTPATIENT)
Dept: FAMILY MEDICINE | Facility: CLINIC | Age: 47
End: 2022-09-27

## 2022-09-27 ENCOUNTER — LAB (OUTPATIENT)
Dept: LAB | Facility: CLINIC | Age: 47
End: 2022-09-27
Payer: COMMERCIAL

## 2022-09-27 VITALS
HEART RATE: 61 BPM | OXYGEN SATURATION: 98 % | RESPIRATION RATE: 16 BRPM | TEMPERATURE: 98.8 F | DIASTOLIC BLOOD PRESSURE: 73 MMHG | WEIGHT: 188 LBS | SYSTOLIC BLOOD PRESSURE: 110 MMHG | BODY MASS INDEX: 26.59 KG/M2

## 2022-09-27 DIAGNOSIS — R50.9 FEVER, UNSPECIFIED FEVER CAUSE: ICD-10-CM

## 2022-09-27 DIAGNOSIS — Z11.3 SCREEN FOR STD (SEXUALLY TRANSMITTED DISEASE): ICD-10-CM

## 2022-09-27 DIAGNOSIS — B27.90 INFECTIOUS MONONUCLEOSIS WITHOUT COMPLICATION, INFECTIOUS MONONUCLEOSIS DUE TO UNSPECIFIED ORGANISM: Primary | ICD-10-CM

## 2022-09-27 DIAGNOSIS — R07.0 THROAT PAIN: ICD-10-CM

## 2022-09-27 LAB
BASOPHILS # BLD MANUAL: 0 10E3/UL (ref 0–0.2)
BASOPHILS NFR BLD MANUAL: 0 %
DEPRECATED S PYO AG THROAT QL EIA: NEGATIVE
EOSINOPHIL # BLD MANUAL: 0.2 10E3/UL (ref 0–0.7)
EOSINOPHIL NFR BLD MANUAL: 2 %
ERYTHROCYTE [DISTWIDTH] IN BLOOD BY AUTOMATED COUNT: 11.6 % (ref 10–15)
GROUP A STREP BY PCR: NOT DETECTED
HCT VFR BLD AUTO: 39.6 % (ref 40–53)
HGB BLD-MCNC: 13.9 G/DL (ref 13.3–17.7)
HIV 1+2 AB+HIV1 P24 AG SERPL QL IA: NONREACTIVE
LYMPHOCYTES # BLD MANUAL: 6.1 10E3/UL (ref 0.8–5.3)
LYMPHOCYTES NFR BLD MANUAL: 54 %
MCH RBC QN AUTO: 32.1 PG (ref 26.5–33)
MCHC RBC AUTO-ENTMCNC: 35.1 G/DL (ref 31.5–36.5)
MCV RBC AUTO: 92 FL (ref 78–100)
MONOCYTES # BLD MANUAL: 0.9 10E3/UL (ref 0–1.3)
MONOCYTES NFR BLD AUTO: POSITIVE %
MONOCYTES NFR BLD MANUAL: 8 %
NEUTROPHILS # BLD MANUAL: 4.1 10E3/UL (ref 1.6–8.3)
NEUTROPHILS NFR BLD MANUAL: 36 %
PLAT MORPH BLD: ABNORMAL
PLATELET # BLD AUTO: 82 10E3/UL (ref 150–450)
RBC # BLD AUTO: 4.33 10E6/UL (ref 4.4–5.9)
RBC MORPH BLD: ABNORMAL
T PALLIDUM AB SER QL: NONREACTIVE
WBC # BLD AUTO: 11.3 10E3/UL (ref 4–11)

## 2022-09-27 PROCEDURE — 87491 CHLMYD TRACH DNA AMP PROBE: CPT

## 2022-09-27 PROCEDURE — 86780 TREPONEMA PALLIDUM: CPT

## 2022-09-27 PROCEDURE — 86308 HETEROPHILE ANTIBODY SCREEN: CPT | Performed by: PHYSICIAN ASSISTANT

## 2022-09-27 PROCEDURE — 85027 COMPLETE CBC AUTOMATED: CPT | Performed by: PHYSICIAN ASSISTANT

## 2022-09-27 PROCEDURE — 87651 STREP A DNA AMP PROBE: CPT | Performed by: PHYSICIAN ASSISTANT

## 2022-09-27 PROCEDURE — 87389 HIV-1 AG W/HIV-1&-2 AB AG IA: CPT

## 2022-09-27 PROCEDURE — 99213 OFFICE O/P EST LOW 20 MIN: CPT | Performed by: PHYSICIAN ASSISTANT

## 2022-09-27 PROCEDURE — 36415 COLL VENOUS BLD VENIPUNCTURE: CPT

## 2022-09-27 PROCEDURE — 85007 BL SMEAR W/DIFF WBC COUNT: CPT | Performed by: PHYSICIAN ASSISTANT

## 2022-09-27 PROCEDURE — 87591 N.GONORRHOEAE DNA AMP PROB: CPT

## 2022-09-27 RX ORDER — SILDENAFIL CITRATE 20 MG/1
TABLET ORAL
COMMUNITY
Start: 2022-07-19

## 2022-09-27 NOTE — LETTER
[facility]  [address] [address]  Phone: [phone]  Fax: [phone]    September 27, 2022        Kem Almonte  [address]  [place] [address]          To whom it may concern:    RE: Kem A Suzy    Khanh was seen due to illness thus unable to work from 9-23-22 to 10-2-22.  He has an illness that may require additional days off due to the expected course lasting up to 12 weeks.      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      Thank you.      Please contact me for questions or concerns.      Sincerely,        Polina Alvarado PA-C

## 2022-09-27 NOTE — PROGRESS NOTES
Assessment & Plan     Infectious mononucleosis without complication, infectious mononucleosis due to unspecified organism      Throat pain    - Streptococcus A Rapid Screen w/Reflex to PCR - Clinic Collect  - Group A Streptococcus PCR Throat Swab  - CBC with platelets and differential  - Mononucleosis screen  - CBC with platelets and differential  - Mononucleosis screen    Fever, unspecified fever cause    - CBC with platelets and differential  - Mononucleosis screen  - CBC with platelets and differential  - Mononucleosis screen     Pt was seen for 1 week hx of fever, muscle aches and fatigue along with worsening sore throat.  He has had previous virtual visit with negative work up thus far including : influenza and Covid 19.  He has had STI testing by primary care which was obtained today but still pending.  His exam reveals exudative tonsillitis and moderate anterior cervical adenopathy. His Monospot was positive and CBC is pending but consistent with early mono with mild thrombocytopenia and lymphocytosis.    I have discussed etiology and typical tx of infectious mono with pt.  Although not typical in this age group his sx and exam is consistent with this diagnosis and he has 2 teens in the house and recently has had new sexual relationship.  I discussed with pt typical course, conservative measures and indications to return to the clinic. PI given and discussed.  I would recommend he recheck his platelets in the next 2 weeks with pcp to ensure they are returning to normal.       Return if symptoms worsen or fail to improve, for Follow up with primary care provider in 1-2 weeks.    Polina Alvarado PA-C  [facility]    Kip Cassidy is a 47 year old male who presents to clinic today for the following health issues:  Chief Complaint   Patient presents with     Throat Problem     Swollen glands      HPI    9-19-22, woke not feeling well generally.    Several covid 19 testing  "was negative.    Chills, night sweats, excessive fatigue.    Through the week low energy, muscle aches.  Tylenol and ibuprofen.  Forehead thermometer 104-107?    Fever improved.  none for 3 days.    swollon glands in neck.    Brain fog.   Sinuses feel full.  Face hurts little, congestion but has been supine in bed long periods.   No urinary frequency, urgency, burning.    Dark color urine.  Hydrating well.    No rash.  No tick exposures.        Review of Systems  Constitutional, HEENT, cardiovascular, pulmonary, gi and gu systems are negative, except as otherwise noted.      Objective    /73   Pulse 61   Temp 98.8  F (37.1  C) (Oral)   Resp 16   Wt 85.3 kg (188 lb)   SpO2 98%   BMI 26.59 kg/m    Physical Exam   Pt is in no acute distress and appears well  Ears patent B:  TM s intact, non-injected. All land marks easily visibile    Nasal mucosa is non-edematous, no discharge.    Pharynx: erythematous, tonsils 2+ hypertrophied, with exudate   Neck supple: tender andterior cervical adenopathy, no posterior adenopathy    Lungs: CTA  Heart: RRR, no murmur, no thrills or heaves   Ext: no edema  Skin: no rashes      Results for orders placed or performed in visit on 09/27/22   Mononucleosis screen     Status: Abnormal   Result Value Ref Range    Mononucleosis Screen Positive (A) Negative   CBC with platelets and differential     Status: Abnormal (Preliminary result)   Result Value Ref Range    WBC Count      RBC Count 4.33 (L) 4.40 - 5.90 10e6/uL    Hemoglobin 13.9 13.3 - 17.7 g/dL    Hematocrit 39.6 (L) 40.0 - 53.0 %    MCV 92 78 - 100 fL    MCH 32.1 26.5 - 33.0 pg    MCHC 35.1 31.5 - 36.5 g/dL    RDW 11.6 10.0 - 15.0 %    Platelet Count 82 (L) 150 - 450 10e3/uL    Narrative    Previously reported component [ % Neutrophils Auto ] is no longer reported.\"  Previously reported component [ % Lymphocytes Auto ] is no longer reported.\"  Previously reported component [ % Monocytes Auto ] is no longer " "reported.\"  Previously reported   component [ % Eosinophils Auto ] is no longer reported.\"  Previously reported component [ % Basophils Auto ] is no longer reported.\"  Previously reported component [ % Immature Grans Auto ] is no longer reported.\"  Previously reported component [   Absolute Neutrophil Auto ] is no longer reported.\"  Previously reported component [ Absolute Lymphocyte Auto ] is no longer reported.\"  Previously reported component [ Absolute Monocytes Auto ] is no longer reported.\"  Previously reported component [   Absolute Eosinophils Auto ] is no longer reported.\"  Previously reported component [ Absolute Basophils Auto ] is no longer reported.\"  Previously reported component [ Absolute Immature Granulocytes Auto ] is no longer reported.\"   Streptococcus A Rapid Screen w/Reflex to PCR - Clinic Collect     Status: Normal    Specimen: Throat; Swab   Result Value Ref Range    Group A Strep antigen Negative Negative   Group A Streptococcus PCR Throat Swab     Status: Normal    Specimen: Throat; Swab   Result Value Ref Range    Group A strep by PCR Not Detected Not Detected    Narrative    The Xpert Xpress Strep A test, performed on the [employer], is a rapid, qualitative in vitro diagnostic test for the detection of Streptococcus pyogenes (Group A ß-hemolytic Streptococcus, Strep A) in throat swab specimens from patients with signs and symptoms of pharyngitis. The Xpert Xpress Strep A test can be used as an aid in the diagnosis of Group A Streptococcal pharyngitis. The assay is not intended to monitor treatment for Group A Streptococcus infections. The Xpert Xpress Strep A test utilizes an automated real-time polymerase chain reaction (PCR) to detect Streptococcus pyogenes DNA.   CBC with platelets and differential     Status: Abnormal (In process)    Narrative    The following orders were created for panel order CBC with platelets and differential.  Procedure                          "      Abnormality         Status                     ---------                               -----------         ------                     CBC with platelets and d...[[id number]]  Abnormal            Preliminary result           Please view results for these tests on the individual orders.   Results for orders placed or performed in visit on 09/27/22   HIV Antigen Antibody Combo     Status: Normal   Result Value Ref Range    HIV Antigen Antibody Combo Nonreactive Nonreactive   Treponema Abs w Reflex to RPR and Titer     Status: Normal   Result Value Ref Range    Treponema Antibody Total Nonreactive Nonreactive

## 2022-09-28 LAB
C TRACH DNA SPEC QL PROBE+SIG AMP: NEGATIVE
N GONORRHOEA DNA SPEC QL NAA+PROBE: NEGATIVE

## 2022-10-01 ENCOUNTER — HEALTH MAINTENANCE LETTER (OUTPATIENT)
Age: 47
End: 2022-10-01

## 2022-10-11 ENCOUNTER — OFFICE VISIT (OUTPATIENT)
Dept: FAMILY MEDICINE | Facility: CLINIC | Age: 47
End: 2022-10-11
Payer: COMMERCIAL

## 2022-10-11 VITALS
HEART RATE: 89 BPM | SYSTOLIC BLOOD PRESSURE: 117 MMHG | HEIGHT: 71 IN | WEIGHT: 177.6 LBS | BODY MASS INDEX: 24.86 KG/M2 | DIASTOLIC BLOOD PRESSURE: 79 MMHG | OXYGEN SATURATION: 98 % | TEMPERATURE: 97.8 F

## 2022-10-11 DIAGNOSIS — D69.6 THROMBOCYTOPENIA (H): ICD-10-CM

## 2022-10-11 DIAGNOSIS — B27.90 MONONUCLEOSIS SYNDROME: Primary | ICD-10-CM

## 2022-10-11 DIAGNOSIS — Z23 NEED FOR VACCINATION: ICD-10-CM

## 2022-10-11 DIAGNOSIS — R89.9 ABNORMAL LABORATORY TEST: ICD-10-CM

## 2022-10-11 LAB
ERYTHROCYTE [DISTWIDTH] IN BLOOD BY AUTOMATED COUNT: 12.8 % (ref 10–15)
HCT VFR BLD AUTO: 38.6 % (ref 40–53)
HGB BLD-MCNC: 12.9 G/DL (ref 13.3–17.7)
MCH RBC QN AUTO: 30.9 PG (ref 26.5–33)
MCHC RBC AUTO-ENTMCNC: 33.4 G/DL (ref 31.5–36.5)
MCV RBC AUTO: 93 FL (ref 78–100)
PLATELET # BLD AUTO: 324 10E3/UL (ref 150–450)
RBC # BLD AUTO: 4.17 10E6/UL (ref 4.4–5.9)
WBC # BLD AUTO: 7.4 10E3/UL (ref 4–11)

## 2022-10-11 PROCEDURE — 36415 COLL VENOUS BLD VENIPUNCTURE: CPT | Performed by: FAMILY MEDICINE

## 2022-10-11 PROCEDURE — 99213 OFFICE O/P EST LOW 20 MIN: CPT | Mod: 25 | Performed by: FAMILY MEDICINE

## 2022-10-11 PROCEDURE — 90471 IMMUNIZATION ADMIN: CPT | Performed by: FAMILY MEDICINE

## 2022-10-11 PROCEDURE — 90686 IIV4 VACC NO PRSV 0.5 ML IM: CPT | Performed by: FAMILY MEDICINE

## 2022-10-11 PROCEDURE — 0124A COVID-19,PF,PFIZER BOOSTER BIVALENT: CPT | Performed by: FAMILY MEDICINE

## 2022-10-11 PROCEDURE — 91312 COVID-19,PF,PFIZER BOOSTER BIVALENT: CPT | Performed by: FAMILY MEDICINE

## 2022-10-11 PROCEDURE — 85027 COMPLETE CBC AUTOMATED: CPT | Performed by: FAMILY MEDICINE

## 2022-10-11 NOTE — PROGRESS NOTES
"  Assessment & Plan     Mononucleosis syndrome  I recommended waiting to return for 6 weeks since symptom onset since he is doing a very intensive sport- hockey.  He agrees. Will check cbc for resolution of thrombocyto.  Fatigue at this point is normal. The patient indicates understanding of these issues and agrees with the plan.   - CBC with platelets; Future  - CBC with platelets    Need for vaccination  Discussed and done today   - INFLUENZA VACCINE IM > 6 MONTHS VALENT IIV4 (AFLURIA/FLUZONE)  - COVID-19,PF,PFIZER BOOSTER BIVALENT (12+YRS)    Thrombocytopenia (H)  Recheck today. Likely due to acute mono   - CBC with platelets; Future  - CBC with platelets    Return if symptoms worsen or fail to improve.    Helena Burgess MD  [facility] [place] Cassidy is a 47 year old, presenting for the following health issues:  ER F/U      HPI     ED/UC Followup:    Facility:  [facility]   Date of visit: 09/27/2022  Reason for visit: Mono   Current Status: Felling better now       Fatigue and lethargy but otherwise feeling better   Doing housework and yardwork    Plays hockey -wondering when he can go back to sports    Labs showed only low platelets and positive mono    Review of Systems   Constitutional, HEENT, cardiovascular, pulmonary, gi and gu systems are negative, except as otherwise noted.      Objective    /79   Pulse 89   Temp 97.8  F (36.6  C) (Tympanic)   Ht 1.803 m (5' 11\")   Wt 80.6 kg (177 lb 9.6 oz)   SpO2 98%   BMI 24.77 kg/m    Body mass index is 24.77 kg/m .  Physical Exam   GENERAL: healthy, alert and no distress  EYES: Eyes grossly normal to inspection, PERRL and conjunctivae and sclerae normal  HENT: ear canals and TM's normal, nose and mouth without ulcers or lesions  NECK: no adenopathy, no asymmetry, masses, or scars and thyroid normal to palpation  RESP: lungs clear to auscultation - no rales, rhonchi or wheezes  CV: regular rate and rhythm, normal S1 S2, no S3 or " S4, no murmur, click or rub, no peripheral edema and peripheral pulses strong  ABDOMEN: soft, nontender, no hepatosplenomegaly, no masses and bowel sounds normal  MS: no gross musculoskeletal defects noted, no edema  NEURO: Normal strength and tone, mentation intact and speech normal  PSYCH: mentation appears normal, affect normal/bright

## 2023-05-14 ENCOUNTER — HEALTH MAINTENANCE LETTER (OUTPATIENT)
Age: 48
End: 2023-05-14

## 2023-09-06 ASSESSMENT — ENCOUNTER SYMPTOMS
EYE PAIN: 0
FEVER: 0
ARTHRALGIAS: 0
SORE THROAT: 0
HEMATOCHEZIA: 0
DYSURIA: 0
WEAKNESS: 0
COUGH: 0
CHILLS: 0
JOINT SWELLING: 0
PARESTHESIAS: 0
DIZZINESS: 0
FREQUENCY: 0
NAUSEA: 0
SHORTNESS OF BREATH: 0
ABDOMINAL PAIN: 0
PALPITATIONS: 0
HEADACHES: 0
CONSTIPATION: 0
MYALGIAS: 0
NERVOUS/ANXIOUS: 0
DIARRHEA: 0
HEMATURIA: 0
HEARTBURN: 0

## 2023-09-08 ENCOUNTER — OFFICE VISIT (OUTPATIENT)
Dept: FAMILY MEDICINE | Facility: CLINIC | Age: 48
End: 2023-09-08
Payer: COMMERCIAL

## 2023-09-08 VITALS
WEIGHT: 187.3 LBS | BODY MASS INDEX: 26.81 KG/M2 | OXYGEN SATURATION: 98 % | TEMPERATURE: 98 F | SYSTOLIC BLOOD PRESSURE: 110 MMHG | RESPIRATION RATE: 16 BRPM | HEIGHT: 70 IN | DIASTOLIC BLOOD PRESSURE: 72 MMHG | HEART RATE: 52 BPM

## 2023-09-08 DIAGNOSIS — Z13.6 SCREENING FOR HEART DISEASE: ICD-10-CM

## 2023-09-08 DIAGNOSIS — Z00.00 ROUTINE GENERAL MEDICAL EXAMINATION AT A HEALTH CARE FACILITY: Primary | ICD-10-CM

## 2023-09-08 DIAGNOSIS — E78.00 ELEVATED CHOLESTEROL: ICD-10-CM
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
## 2023-09-08 PROBLEM — D69.6 THROMBOCYTOPENIA (H): Status: ACTIVE | Noted: 2023-09-08

## 2023-09-08 LAB
ALBUMIN SERPL BCG-MCNC: 4.7 G/DL (ref 3.5–5.2)
ALP SERPL-CCNC: 51 U/L (ref 40–129)
ALT SERPL W P-5'-P-CCNC: 21 U/L (ref 0–70)
ANION GAP SERPL CALCULATED.3IONS-SCNC: 11 MMOL/L (ref 7–15)
AST SERPL W P-5'-P-CCNC: 31 U/L (ref 0–45)
BILIRUB SERPL-MCNC: 0.5 MG/DL
BUN SERPL-MCNC: 16 MG/DL (ref 6–20)
CALCIUM SERPL-MCNC: 9.5 MG/DL (ref 8.6–10)
CHLORIDE SERPL-SCNC: 103 MMOL/L (ref 98–107)
CHOLEST SERPL-MCNC: 223 MG/DL
CREAT SERPL-MCNC: 0.91 MG/DL (ref 0.67–1.17)
DEPRECATED HCO3 PLAS-SCNC: 26 MMOL/L (ref 22–29)
EGFRCR SERPLBLD CKD-EPI 2021: >90 ML/MIN/1.73M2
ERYTHROCYTE [DISTWIDTH] IN BLOOD BY AUTOMATED COUNT: 12.1 % (ref 10–15)
GLUCOSE SERPL-MCNC: 104 MG/DL (ref 70–99)
HBA1C MFR BLD: 5.3 % (ref 0–5.6)
HCT VFR BLD AUTO: 43.8 % (ref 40–53)
HDLC SERPL-MCNC: 66 MG/DL
HGB BLD-MCNC: 15.5 G/DL (ref 13.3–17.7)
LDLC SERPL CALC-MCNC: 143 MG/DL
MCH RBC QN AUTO: 31.7 PG (ref 26.5–33)
MCHC RBC AUTO-ENTMCNC: 35.4 G/DL (ref 31.5–36.5)
MCV RBC AUTO: 90 FL (ref 78–100)
NONHDLC SERPL-MCNC: 157 MG/DL
PLATELET # BLD AUTO: 277 10E3/UL (ref 150–450)
POTASSIUM SERPL-SCNC: 4.7 MMOL/L (ref 3.4–5.3)
PROT SERPL-MCNC: 7.8 G/DL (ref 6.4–8.3)
PSA SERPL DL<=0.01 NG/ML-MCNC: 0.64 NG/ML (ref 0–3.5)
RBC # BLD AUTO: 4.89 10E6/UL (ref 4.4–5.9)
SODIUM SERPL-SCNC: 140 MMOL/L (ref 136–145)
TRIGL SERPL-MCNC: 68 MG/DL
TSH SERPL DL<=0.005 MIU/L-ACNC: 1.7 UIU/ML (ref 0.3–4.2)
WBC # BLD AUTO: 6.1 10E3/UL (ref 4–11)

## 2023-09-08 PROCEDURE — G0103 PSA SCREENING: HCPCS | Performed by: STUDENT IN AN ORGANIZED HEALTH CARE EDUCATION/TRAINING PROGRAM

## 2023-09-08 PROCEDURE — 85027 COMPLETE CBC AUTOMATED: CPT | Performed by: STUDENT IN AN ORGANIZED HEALTH CARE EDUCATION/TRAINING PROGRAM

## 2023-09-08 PROCEDURE — 90471 IMMUNIZATION ADMIN: CPT | Performed by: STUDENT IN AN ORGANIZED HEALTH CARE EDUCATION/TRAINING PROGRAM

## 2023-09-08 PROCEDURE — 36415 COLL VENOUS BLD VENIPUNCTURE: CPT | Performed by: STUDENT IN AN ORGANIZED HEALTH CARE EDUCATION/TRAINING PROGRAM

## 2023-09-08 PROCEDURE — 84443 ASSAY THYROID STIM HORMONE: CPT | Performed by: STUDENT IN AN ORGANIZED HEALTH CARE EDUCATION/TRAINING PROGRAM

## 2023-09-08 PROCEDURE — 80061 LIPID PANEL: CPT | Performed by: STUDENT IN AN ORGANIZED HEALTH CARE EDUCATION/TRAINING PROGRAM

## 2023-09-08 PROCEDURE — 90682 RIV4 VACC RECOMBINANT DNA IM: CPT | Performed by: STUDENT IN AN ORGANIZED HEALTH CARE EDUCATION/TRAINING PROGRAM

## 2023-09-08 PROCEDURE — 90750 HZV VACC RECOMBINANT IM: CPT | Performed by: STUDENT IN AN ORGANIZED HEALTH CARE EDUCATION/TRAINING PROGRAM

## 2023-09-08 PROCEDURE — 90472 IMMUNIZATION ADMIN EACH ADD: CPT | Performed by: STUDENT IN AN ORGANIZED HEALTH CARE EDUCATION/TRAINING PROGRAM

## 2023-09-08 PROCEDURE — 83036 HEMOGLOBIN GLYCOSYLATED A1C: CPT | Performed by: STUDENT IN AN ORGANIZED HEALTH CARE EDUCATION/TRAINING PROGRAM

## 2023-09-08 PROCEDURE — 99396 PREV VISIT EST AGE 40-64: CPT | Mod: 25 | Performed by: STUDENT IN AN ORGANIZED HEALTH CARE EDUCATION/TRAINING PROGRAM

## 2023-09-08 PROCEDURE — 80053 COMPREHEN METABOLIC PANEL: CPT | Performed by: STUDENT IN AN ORGANIZED HEALTH CARE EDUCATION/TRAINING PROGRAM

## 2023-09-08 RX ORDER — VIT C/B6/B5/MAGNESIUM/HERB 173 50-5-6-5MG
500 CAPSULE ORAL DAILY
COMMUNITY
Start: 2023-01-01

## 2023-09-08 ASSESSMENT — ENCOUNTER SYMPTOMS
FEVER: 0
EYE PAIN: 0
HEMATURIA: 0
HEARTBURN: 0
JOINT SWELLING: 0
WEAKNESS: 0
ARTHRALGIAS: 0
ABDOMINAL PAIN: 0
NERVOUS/ANXIOUS: 0
HEMATOCHEZIA: 0
MYALGIAS: 0
FREQUENCY: 0
PARESTHESIAS: 0
DYSURIA: 0
DIARRHEA: 0
SHORTNESS OF BREATH: 0
PALPITATIONS: 0
NAUSEA: 0
CHILLS: 0
COUGH: 0
DIZZINESS: 0
CONSTIPATION: 0
HEADACHES: 0
SORE THROAT: 0

## 2023-09-08 ASSESSMENT — PAIN SCALES - GENERAL: PAINLEVEL: NO PAIN (0)
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
## 2024-01-01 NOTE — PROGRESS NOTES
Pt seen for SubMax VO2 Test on 2-12-20 at [facility] [address]    Estimated VO2 Max: 58.5    Looking to improve fitness - currently does Crossfit and plays Ice Hockey    Active 4-6 Days per week     Recommendations:   1) Incorporate more aerobic activity and vary intensity of workouts   2) Increase carbohydrate intake to support intense workouts  (brown rice, quinoa, squash etc..)    Kris Vega, Health &     English

## 2024-02-12 ENCOUNTER — TELEPHONE (OUTPATIENT)
Dept: FAMILY MEDICINE | Facility: CLINIC | Age: 49
End: 2024-02-12
Payer: COMMERCIAL

## 2024-03-20 SDOH — HEALTH STABILITY: PHYSICAL HEALTH: ON AVERAGE, HOW MANY MINUTES DO YOU ENGAGE IN EXERCISE AT THIS LEVEL?: 40 MIN

## 2024-03-20 SDOH — HEALTH STABILITY: PHYSICAL HEALTH: ON AVERAGE, HOW MANY DAYS PER WEEK DO YOU ENGAGE IN MODERATE TO STRENUOUS EXERCISE (LIKE A BRISK WALK)?: 5 DAYS

## 2024-03-20 ASSESSMENT — SOCIAL DETERMINANTS OF HEALTH (SDOH): HOW OFTEN DO YOU GET TOGETHER WITH FRIENDS OR RELATIVES?: MORE THAN THREE TIMES A WEEK

## 2024-03-21 ENCOUNTER — OFFICE VISIT (OUTPATIENT)
Dept: FAMILY MEDICINE | Facility: CLINIC | Age: 49
End: 2024-03-21
Payer: COMMERCIAL

## 2024-03-21 VITALS
OXYGEN SATURATION: 98 % | HEART RATE: 62 BPM | BODY MASS INDEX: 26.92 KG/M2 | DIASTOLIC BLOOD PRESSURE: 74 MMHG | HEIGHT: 70 IN | SYSTOLIC BLOOD PRESSURE: 128 MMHG | TEMPERATURE: 97.6 F | WEIGHT: 188 LBS

## 2024-03-21 DIAGNOSIS — Z00.00 ROUTINE GENERAL MEDICAL EXAMINATION AT A HEALTH CARE FACILITY: Primary | ICD-10-CM

## 2024-03-21 DIAGNOSIS — R73.9 ELEVATED RANDOM BLOOD GLUCOSE LEVEL: ICD-10-CM

## 2024-03-21 DIAGNOSIS — E78.5 HYPERLIPIDEMIA WITH TARGET LDL LESS THAN 70: ICD-10-CM

## 2024-03-21 PROBLEM — D69.6 THROMBOCYTOPENIA (H): Status: RESOLVED | Noted: 2023-09-08 | Resolved: 2024-03-21

## 2024-03-21 LAB
ALBUMIN SERPL BCG-MCNC: 4.5 G/DL (ref 3.5–5.2)
ALP SERPL-CCNC: 47 U/L (ref 40–150)
ALT SERPL W P-5'-P-CCNC: 27 U/L (ref 0–70)
ANION GAP SERPL CALCULATED.3IONS-SCNC: 13 MMOL/L (ref 7–15)
AST SERPL W P-5'-P-CCNC: 29 U/L (ref 0–45)
BASOPHILS # BLD AUTO: 0 10E3/UL (ref 0–0.2)
BASOPHILS NFR BLD AUTO: 1 %
BILIRUB SERPL-MCNC: 0.4 MG/DL
BUN SERPL-MCNC: 18.2 MG/DL (ref 6–20)
CALCIUM SERPL-MCNC: 9.6 MG/DL (ref 8.6–10)
CHLORIDE SERPL-SCNC: 102 MMOL/L (ref 98–107)
CHOLEST SERPL-MCNC: 249 MG/DL
CREAT SERPL-MCNC: 0.98 MG/DL (ref 0.67–1.17)
DEPRECATED HCO3 PLAS-SCNC: 24 MMOL/L (ref 22–29)
EGFRCR SERPLBLD CKD-EPI 2021: >90 ML/MIN/1.73M2
EOSINOPHIL # BLD AUTO: 0.1 10E3/UL (ref 0–0.7)
EOSINOPHIL NFR BLD AUTO: 1 %
ERYTHROCYTE [DISTWIDTH] IN BLOOD BY AUTOMATED COUNT: 11.8 % (ref 10–15)
FASTING STATUS PATIENT QL REPORTED: YES
GLUCOSE SERPL-MCNC: 102 MG/DL (ref 70–99)
HCT VFR BLD AUTO: 43.5 % (ref 40–53)
HDLC SERPL-MCNC: 69 MG/DL
HGB BLD-MCNC: 15 G/DL (ref 13.3–17.7)
IMM GRANULOCYTES # BLD: 0 10E3/UL
IMM GRANULOCYTES NFR BLD: 0 %
LDLC SERPL CALC-MCNC: 169 MG/DL
LYMPHOCYTES # BLD AUTO: 1.4 10E3/UL (ref 0.8–5.3)
LYMPHOCYTES NFR BLD AUTO: 21 %
MCH RBC QN AUTO: 30.9 PG (ref 26.5–33)
MCHC RBC AUTO-ENTMCNC: 34.5 G/DL (ref 31.5–36.5)
MCV RBC AUTO: 90 FL (ref 78–100)
MONOCYTES # BLD AUTO: 0.6 10E3/UL (ref 0–1.3)
MONOCYTES NFR BLD AUTO: 8 %
NEUTROPHILS # BLD AUTO: 4.6 10E3/UL (ref 1.6–8.3)
NEUTROPHILS NFR BLD AUTO: 69 %
NONHDLC SERPL-MCNC: 180 MG/DL
PLATELET # BLD AUTO: 258 10E3/UL (ref 150–450)
POTASSIUM SERPL-SCNC: 4.7 MMOL/L (ref 3.4–5.3)
PROT SERPL-MCNC: 7.5 G/DL (ref 6.4–8.3)
PSA SERPL DL<=0.01 NG/ML-MCNC: 0.76 NG/ML (ref 0–3.5)
RBC # BLD AUTO: 4.85 10E6/UL (ref 4.4–5.9)
SODIUM SERPL-SCNC: 139 MMOL/L (ref 135–145)
TRIGL SERPL-MCNC: 55 MG/DL
TSH SERPL DL<=0.005 MIU/L-ACNC: 2.22 UIU/ML (ref 0.3–4.2)
WBC # BLD AUTO: 6.7 10E3/UL (ref 4–11)

## 2024-03-21 PROCEDURE — 80061 LIPID PANEL: CPT | Performed by: PHYSICIAN ASSISTANT

## 2024-03-21 PROCEDURE — 90471 IMMUNIZATION ADMIN: CPT | Performed by: PHYSICIAN ASSISTANT

## 2024-03-21 PROCEDURE — 83036 HEMOGLOBIN GLYCOSYLATED A1C: CPT | Performed by: PHYSICIAN ASSISTANT

## 2024-03-21 PROCEDURE — 85025 COMPLETE CBC W/AUTO DIFF WBC: CPT | Performed by: PHYSICIAN ASSISTANT

## 2024-03-21 PROCEDURE — 80053 COMPREHEN METABOLIC PANEL: CPT | Performed by: PHYSICIAN ASSISTANT

## 2024-03-21 PROCEDURE — 84443 ASSAY THYROID STIM HORMONE: CPT | Performed by: PHYSICIAN ASSISTANT

## 2024-03-21 PROCEDURE — 99396 PREV VISIT EST AGE 40-64: CPT | Mod: 25 | Performed by: PHYSICIAN ASSISTANT

## 2024-03-21 PROCEDURE — 36415 COLL VENOUS BLD VENIPUNCTURE: CPT | Performed by: PHYSICIAN ASSISTANT

## 2024-03-21 PROCEDURE — G0103 PSA SCREENING: HCPCS | Performed by: PHYSICIAN ASSISTANT

## 2024-03-21 PROCEDURE — 90750 HZV VACC RECOMBINANT IM: CPT | Performed by: PHYSICIAN ASSISTANT

## 2024-03-21 NOTE — PATIENT INSTRUCTIONS
Preventive Care Advice   This is general advice given by our system to help you stay healthy. However, your care team may have specific advice just for you. Please talk to your care team about your preventive care needs.  Nutrition  Eat 5 or more servings of fruits and vegetables each day.  Try wheat bread, brown rice and whole grain pasta (instead of white bread, rice, and pasta).  Get enough calcium and vitamin D. Check the label on foods and aim for 100% of the RDA (recommended daily allowance).  Lifestyle  Exercise at least 150 minutes each week   (30 minutes a day, 5 days a week).  Do muscle strengthening activities 2 days a week. These help control your weight and prevent disease.  No smoking.  Wear sunscreen to prevent skin cancer.  Have a dental exam and cleaning every 6 months.  Yearly exams  See your health care team every year to talk about:  Any changes in your health.  Any medicines your care team has prescribed.  Preventive care, family planning, and ways to prevent chronic diseases.  Shots (vaccines)   HPV shots (up to age 26), if you've never had them before.  Hepatitis B shots (up to age 59), if you've never had them before.  COVID-19 shot: Get this shot when it's due.  Flu shot: Get a flu shot every year.  Tetanus shot: Get a tetanus shot every 10 years.  Pneumococcal, hepatitis A, and RSV shots: Ask your care team if you need these based on your risk.  Shingles shot (for age 50 and up).  General health tests  Diabetes screening:  Starting at age 35, Get screened for diabetes at least every 3 years.  If you are younger than age 35, ask your care team if you should be screened for diabetes.  Cholesterol test: At age 39, start having a cholesterol test every 5 years, or more often if advised.  Bone density scan (DEXA): At age 50, ask your care team if you should have this scan for osteoporosis (brittle bones).  Hepatitis C: Get tested at least once in your life.  STIs (sexually transmitted  infections)  Before age 24: Ask your care team if you should be screened for STIs.  After age 24: Get screened for STIs if you're at risk. You are at risk for STIs (including HIV) if:  You are sexually active with more than one person.  You don't use condoms every time.  You or a partner was diagnosed with a sexually transmitted infection.  If you are at risk for HIV, ask about PrEP medicine to prevent HIV.  Get tested for HIV at least once in your life, whether you are at risk for HIV or not.  Cancer screening tests  Cervical cancer screening: If you have a cervix, begin getting regular cervical cancer screening tests at age 21. Most people who have regular screenings with normal results can stop after age 65. Talk about this with your provider.  Breast cancer scan (mammogram): If you've ever had breasts, begin having regular mammograms starting at age 40. This is a scan to check for breast cancer.  Colon cancer screening: It is important to start screening for colon cancer at age 45.  Have a colonoscopy test every 10 years (or more often if you're at risk) Or, ask your provider about stool tests like a FIT test every year or Cologuard test every 3 years.  To learn more about your testing options, visit: https://www.Apcera/212077.pdf.  For help making a decision, visit: https://bit.ly/od18005.  Prostate cancer screening test: If you have a prostate and are age 55 to 69, ask your provider if you would benefit from a yearly prostate cancer screening test.  Lung cancer screening: If you are a current or former smoker age 50 to 80, ask your care team if ongoing lung cancer screenings are right for you.  For informational purposes only. Not to replace the advice of your health care provider. Copyright   2023 [employer]. All rights reserved. Clinically reviewed by the [facility] Transitions Program. [employer] [id number] - REV 01/24.    Learning About Stress  What is stress?     Stress is your  body's response to a hard situation. Your body can have a physical, emotional, or mental response. Stress is a fact of life for most people, and it affects everyone differently. What causes stress for you may not be stressful for someone else.  A lot of things can cause stress. You may feel stress when you go on a job interview, take a test, or run a race. This kind of short-term stress is normal and even useful. It can help you if you need to work hard or react quickly. For example, stress can help you finish an important job on time.  Long-term stress is caused by ongoing stressful situations or events. Examples of long-term stress include long-term health problems, ongoing problems at work, or conflicts in your family. Long-term stress can harm your health.  How does stress affect your health?  When you are stressed, your body responds as though you are in danger. It makes hormones that speed up your heart, make you breathe faster, and give you a burst of energy. This is called the fight-or-flight stress response. If the stress is over quickly, your body goes back to normal and no harm is done.  But if stress happens too often or lasts too long, it can have bad effects. Long-term stress can make you more likely to get sick, and it can make symptoms of some diseases worse. If you tense up when you are stressed, you may develop neck, shoulder, or low back pain. Stress is linked to high blood pressure and heart disease.  Stress also harms your emotional health. It can make you calle, tense, or depressed. Your relationships may suffer, and you may not do well at work or school.  What can you do to manage stress?  You can try these things to help manage stress:   Do something active. Exercise or activity can help reduce stress. Walking is a great way to get started. Even everyday activities such as housecleaning or yard work can help.  Try yoga or tisha chi. These techniques combine exercise and meditation. You may need  some training at first to learn them.  Do something you enjoy. For example, listen to music or go to a movie. Practice your hobby or do volunteer work.  Meditate. This can help you relax, because you are not worrying about what happened before or what may happen in the future.  Do guided imagery. Imagine yourself in any setting that helps you feel calm. You can use online videos, books, or a teacher to guide you.  Do breathing exercises. For example:  From a standing position, bend forward from the waist with your knees slightly bent. Let your arms dangle close to the floor.  Breathe in slowly and deeply as you return to a standing position. Roll up slowly and lift your head last.  Hold your breath for just a few seconds in the standing position.  Breathe out slowly and bend forward from the waist.  Let your feelings out. Talk, laugh, cry, and express anger when you need to. Talking with supportive friends or family, a counselor, or a deandra leader about your feelings is a healthy way to relieve stress. Avoid discussing your feelings with people who make you feel worse.  Write. It may help to write about things that are bothering you. This helps you find out how much stress you feel and what is causing it. When you know this, you can find better ways to cope.  What can you do to prevent stress?  You might try some of these things to help prevent stress:  Manage your time. This helps you find time to do the things you want and need to do.  Get enough sleep. Your body recovers from the stresses of the day while you are sleeping.  Get support. Your family, friends, and community can make a difference in how you experience stress.  Limit your news feed. Avoid or limit time on social media or news that may make you feel stressed.  Do something active. Exercise or activity can help reduce stress. Walking is a great way to get started.  Where can you learn more?  Go to https://www.healthwise.net/patiented  Enter N032 in the  "search box to learn more about \"Learning About Stress.\"  Current as of: October 24, 2023               Content Version: 14.0    7378-5983 [employer].   Care instructions adapted under license by your healthcare professional. If you have questions about a medical condition or this instruction, always ask your healthcare professional. [employer] disclaims any warranty or liability for your use of this information.      "

## 2024-03-21 NOTE — PROGRESS NOTES
"Preventive Care Visit  [facility] GEOR[place]NA Tracy PA-C, Family Medicine  Mar 21, 2024      Assessment & Plan       ICD-10-CM    1. Routine general medical examination at a health care facility  Z00.00 CBC with platelets and differential     TSH with free T4 reflex     Lipid panel reflex to direct LDL Fasting     PSA, screen     Comprehensive metabolic panel (BMP + Alb, Alk Phos, ALT, AST, Total. Bili, TP)     CT Coronary Calcium Scan     CBC with platelets and differential     TSH with free T4 reflex     Lipid panel reflex to direct LDL Fasting     PSA, screen     Comprehensive metabolic panel (BMP + Alb, Alk Phos, ALT, AST, Total. Bili, TP)      2. Hyperlipidemia with target LDL less than 70  E78.5           Patient has been advised of split billing requirements and indicates understanding: Yes          BMI  Estimated body mass index is 26.98 kg/m  as calculated from the following:    Height as of this encounter: 1.778 m (5' 10\").    Weight as of this encounter: 85.3 kg (188 lb).       Counseling  Appropriate preventive services were discussed with this patient, including applicable screening as appropriate for fall prevention, nutrition, physical activity, Tobacco-use cessation, weight loss and cognition.  Checklist reviewing preventive services available has been given to the patient.  Reviewed patient's diet, addressing concerns and/or questions.   He is at risk for psychosocial distress and has been provided with information to reduce risk.           Kip Cassidy is a 49 year old, presenting for the following:  Physical        3/21/2024     8:11 AM   Additional Questions   Roomed by SCOTT Sunshine        Health Care Directive  Patient does not have a Health Care Directive or Living Will: Discussed advance care planning with patient; however, patient declined at this time.    HPI          3/20/2024   General Health   How would you rate your overall physical health? Excellent   Feel stress " (tense, anxious, or unable to sleep) Only a little   (!) STRESS CONCERN      3/20/2024   Nutrition   Three or more servings of calcium each day? Yes   Diet: Regular (no restrictions)   How many servings of fruit and vegetables per day? (!) 2-3   How many sweetened beverages each day? 0-1         3/20/2024   Exercise   Days per week of moderate/strenous exercise 5 days   Average minutes spent exercising at this level 40 min         3/20/2024   Social Factors   Frequency of gathering with friends or relatives More than three times a week   Worry food won't last until get money to buy more No   Food not last or not have enough money for food? No   Do you have housing?  Yes   Are you worried about losing your housing? No   Lack of transportation? No   Unable to get utilities (heat,electricity)? No         3/20/2024   Fall Risk   Fallen 2 or more times in the past year? No   Trouble with walking or balance? No          3/20/2024   Dental   Dentist two times every year? Yes         3/20/2024   TB Screening   Were you born outside of the [place]? No         Today's PHQ-2 Score:       3/20/2024     6:01 PM   PHQ-2 ( 1999 Pfizer)   Q1: Little interest or pleasure in doing things 0   Q2: Feeling down, depressed or hopeless 0   PHQ-2 Score 0   Q1: Little interest or pleasure in doing things Not at all   Q2: Feeling down, depressed or hopeless Not at all   PHQ-2 Score 0           3/20/2024   Substance Use   Alcohol more than 3/day or more than 7/wk No   Do you use any other substances recreationally? No     Social History     Tobacco Use    Smoking status: Never     Passive exposure: Never    Smokeless tobacco: Never   Substance Use Topics    Alcohol use: Yes     Alcohol/week: 10.0 standard drinks of alcohol    Drug use: No           3/20/2024   STI Screening   New sexual partner(s) since last STI/HIV test? No   ASCVD Risk   The 10-year ASCVD risk score (Miah CUMMINGS, et al., 2019) is: 3.3%    Values used to calculate the  "score:      Age: 49 years      Sex: Male      Is Non- : No      Diabetic: No      Tobacco smoker: No      Systolic Blood Pressure: 128 mmHg      Is BP treated: No      HDL Cholesterol: 66 mg/dL      Total Cholesterol: 223 mg/dL        Reviewed and updated as needed this visit by Provider                    BP Readings from Last 3 Encounters:   03/21/24 128/74   09/08/23 110/72   10/11/22 117/79    Wt Readings from Last 3 Encounters:   03/21/24 85.3 kg (188 lb)   09/08/23 85 kg (187 lb 4.8 oz)   10/11/22 80.6 kg (177 lb 9.6 oz)                      Review of Systems  CONSTITUTIONAL: NEGATIVE for fever, chills, change in weight  INTEGUMENTARY/SKIN: NEGATIVE for worrisome rashes, moles or lesions  EYES: NEGATIVE for vision changes or irritation  ENT/MOUTH: NEGATIVE for ear, mouth and throat problems  RESP: NEGATIVE for significant cough or SOB  BREAST: NEGATIVE for masses, tenderness or discharge  CV: NEGATIVE for chest pain, palpitations or peripheral edema  GI: NEGATIVE for nausea, abdominal pain, heartburn, or change in bowel habits  : NEGATIVE for frequency, dysuria, or hematuria  MUSCULOSKELETAL: NEGATIVE for significant arthralgias or myalgia  NEURO: NEGATIVE for weakness, dizziness or paresthesias  ENDOCRINE: NEGATIVE for temperature intolerance, skin/hair changes  HEME: NEGATIVE for bleeding problems  PSYCHIATRIC: NEGATIVE for changes in mood or affect     Objective    Exam  /74   Pulse 62   Temp 97.6  F (36.4  C) (Tympanic)   Ht 1.778 m (5' 10\")   Wt 85.3 kg (188 lb)   SpO2 98%   BMI 26.98 kg/m     Estimated body mass index is 26.98 kg/m  as calculated from the following:    Height as of this encounter: 1.778 m (5' 10\").    Weight as of this encounter: 85.3 kg (188 lb).    Physical Exam  GENERAL: alert and no distress  EYES: Eyes grossly normal to inspection, PERRL and conjunctivae and sclerae normal  HENT: ear canals and TM's normal, nose and mouth without ulcers or " lesions  NECK: no adenopathy, no asymmetry, masses, or scars  RESP: lungs clear to auscultation - no rales, rhonchi or wheezes  CV: regular rate and rhythm, normal S1 S2, no S3 or S4, no murmur, click or rub, no peripheral edema  ABDOMEN: soft, nontender, no hepatosplenomegaly, no masses and bowel sounds normal  MS: no gross musculoskeletal defects noted, no edema  SKIN: no suspicious lesions or rashes  NEURO: Normal strength and tone, mentation intact and speech normal  PSYCH: mentation appears normal, affect normal/bright        Signed Electronically by: Susy Tracy PA-C

## 2024-03-24 DIAGNOSIS — R73.9 ELEVATED RANDOM BLOOD GLUCOSE LEVEL: Primary | ICD-10-CM

## 2024-03-25 LAB — HBA1C MFR BLD: 5.4 % (ref 0–5.6)

## 2024-04-15 ENCOUNTER — HOSPITAL ENCOUNTER (OUTPATIENT)
Dept: CT IMAGING | Facility: CLINIC | Age: 49
Discharge: HOME | End: 2024-04-15
Attending: PHYSICIAN ASSISTANT | Admitting: PHYSICIAN ASSISTANT
Payer: COMMERCIAL

## 2024-04-15 DIAGNOSIS — Z00.00 ROUTINE GENERAL MEDICAL EXAMINATION AT A HEALTH CARE FACILITY: ICD-10-CM

## 2024-04-15 LAB
CV CALCIUM SCORE AGATSTON LM: 11
CV CALCIUM SCORING AGATSON LAD: 8
CV CALCIUM SCORING AGATSTON CX: 21
CV CALCIUM SCORING AGATSTON RCA: 20
CV CALCIUM SCORING AGATSTON TOTAL: 60

## 2024-04-15 PROCEDURE — 75571 CT HRT W/O DYE W/CA TEST: CPT

## 2024-04-15 PROCEDURE — 75571 CT HRT W/O DYE W/CA TEST: CPT | Mod: 26 | Performed by: GENERAL ACUTE CARE HOSPITAL

## 2024-06-05 ENCOUNTER — TELEPHONE (OUTPATIENT)
Dept: DERMATOLOGY | Facility: CLINIC | Age: 49
End: 2024-06-05
Payer: COMMERCIAL

## 2024-06-05 NOTE — TELEPHONE ENCOUNTER
"Dr Federico Mcnair is unfornately unavailable on 09/03/2024. Your appointment will need to be rescheduled. We apologize for the inconvenience.    Please call [phone] to schedule the visit(s).      (If you prefer, some clinics allow you to schedule at [email or web address]. Click the \"Visits\" tab, then choose \"Schedule an Appointment.\")        Sincerely, [facility]    "

## 2024-10-08 ENCOUNTER — OFFICE VISIT (OUTPATIENT)
Dept: DERMATOLOGY | Facility: CLINIC | Age: 49
End: 2024-10-08
Payer: COMMERCIAL

## 2024-10-08 DIAGNOSIS — Z80.8 FAMILY HISTORY OF SKIN CANCER: ICD-10-CM

## 2024-10-08 DIAGNOSIS — D22.9 MULTIPLE BENIGN NEVI: ICD-10-CM

## 2024-10-08 DIAGNOSIS — L57.8 ACTINIC SKIN DAMAGE: Primary | ICD-10-CM

## 2024-10-08 PROCEDURE — 99203 OFFICE O/P NEW LOW 30 MIN: CPT | Performed by: STUDENT IN AN ORGANIZED HEALTH CARE EDUCATION/TRAINING PROGRAM

## 2024-10-08 ASSESSMENT — PAIN SCALES - GENERAL: PAINLEVEL: NO PAIN (0)

## 2024-10-08 NOTE — LETTER
10/8/2024       RE: Kem Almonte  [address]  [place] [address]     Dear Colleague,    Thank you for referring your patient, Kem Almonte, to the [facility] [place] [facility]. Please see a copy of my visit note below.    I have personally examined this patient and agree with the medical student's documentation and plan of care. I have reviewed and amended the medical student's note as necessary.The documentation accurately reflects my clinical observations, diagnoses, treatment and follow-up plans.     Alli Caballero MD  Dermatology Staff      [facility] Dermatology Note  Encounter Date: Oct 8, 2024  Office Visit     Dermatology Problem List:  1. Family history of NMSC  - mother had BCC     2. Benign full body skin exam 10/08/2024     ____________________________________________    Assessment & Plan:      # Benign lesions: Solar lentigo, seborrheic keratoses, cherry angiomas   #Family history of NMSC  - Assured patient benign nature of lesions, especially the seborrheic keratosis on his inguinal fold which had some concerns about.   - Recommended continued use of SPF30+ sunscreen on face and extremities and any other sun-exposed areas, and being judicious with time spent in midday sun, especially given his profession as an .   - No treatment is necessary at this time unless the lesions change or become symptomatic     Procedures Performed:   None    Follow-up: 1 year(s) in-person, or earlier for new or changing lesions    Staff and Medical Student:     Suzette Perez, MS3   [employer]       ____________________________________________    CC: Skin Check (Khanh is here today for a full body skin examination. Has new areas of concern on his right groin area. )    HPI:  Mr. Kem Almonte is a(n) 49 year old male , with family history of BCC,  "who presents today as a new patient for a full body skin evaluation. He has some concerns about 3 small lesions on his right inguinal fold that he would like evaluated. He thinks they have changed over time. He denies any itching, pain, burning, or bleeding associated with the lesions. He thinks \"they're probably skin tags\" but given his family history of NMSC, has some worries. He uses sunscreen (SPF30+) on his face and scalp most days but not on a regular basis and usually not during flights.     Patient is otherwise feeling well, without additional skin concerns.    Labs:  None reviewed.    Physical Exam:  Vitals: There were no vitals taken for this visit.  SKIN: Total skin excluding the undergarment areas was performed. The exam included the head/face, neck, both arms, chest, back, abdomen, both legs, digits and/or nails.   - three 2-5mm tan to brown papules with reticular pattern on right inguinal fold  - few 1-2mm red papules on chest and abdomen   - multiple light brown to brown scattered macules and papules with reticular pattern on back   - No other lesions of concern on areas examined.     Medications:  Current Outpatient Medications   Medication Sig Dispense Refill     Ascorbic Acid (VITAMIN C PO) Take 1,000 mg by mouth daily       cholecalciferol, vitamin D3, (VITAMIN D3 ORAL) Take 300 mg by mouth daily       Coenzyme Q10 (COQ-10 PO) Take 300 mg by mouth daily       COLLAGEN PO        GLUTATHIONE PO Take 2,000 mg by mouth daily       MILK THISTLE PO        MULTIVITAMIN ORAL [MULTIVITAMIN ORAL] Take 1 tablet by mouth daily.       omega-3/dha/epa/fish oil (FISH OIL-OMEGA-3 FATTY ACIDS) 300-1,000 mg capsule [OMEGA-3/DHA/EPA/FISH OIL (FISH OIL-OMEGA-3 FATTY ACIDS) 300-1,000 MG CAPSULE] Take 2 g by mouth daily.       sildenafil (REVATIO) 20 MG tablet        Turmeric 500 MG CAPS Take 500 mg by mouth daily       UNABLE TO FIND POWDERS IN SMOOTHIS: Creatine Powder, Beta Alanine, D-Ribrose, Magnesium Citrate, " Arginine       No current facility-administered medications for this visit.      Past Medical History:   Patient Active Problem List   Diagnosis     Overweight (BMI 25.0-29.9)     Podagra - left bunion joint gout, typically every June.     Chronic gout without tophus, unspecified cause, unspecified site     Hyperlipidemia with target LDL less than 70     Erectile dysfunction, unspecified erectile dysfunction type     Anxiety     Alcohol use     History reviewed. No pertinent past medical history.     CC Referred Self, MD  No address on file on close of this encounter.      Again, thank you for allowing me to participate in the care of your patient.      Sincerely,    Alli Caballero MD

## 2024-10-08 NOTE — NURSING NOTE
Dermatology Rooming Note    Kem Almonte's goals for this visit include:   Chief Complaint   Patient presents with    Skin Check     Khanh is here today for a full body skin examination. Has new areas of concern on his right groin area.       Graham MESA CMA

## 2024-10-08 NOTE — PROGRESS NOTES
"I have personally examined this patient and agree with the medical student's documentation and plan of care. I have reviewed and amended the medical student's note as necessary.The documentation accurately reflects my clinical observations, diagnoses, treatment and follow-up plans.     Alli Caballero MD  Dermatology Staff      [facility] Dermatology Note  Encounter Date: Oct 8, 2024  Office Visit     Dermatology Problem List:  1. Family history of NMSC  - mother had BCC     2. Benign full body skin exam 10/08/2024     ____________________________________________    Assessment & Plan:      # Benign lesions: Solar lentigo, seborrheic keratoses, cherry angiomas   #Family history of NMSC  - Assured patient benign nature of lesions, especially the seborrheic keratosis on his inguinal fold which had some concerns about.   - Recommended continued use of SPF30+ sunscreen on face and extremities and any other sun-exposed areas, and being judicious with time spent in midday sun, especially given his profession as an .   - No treatment is necessary at this time unless the lesions change or become symptomatic     Procedures Performed:   None    Follow-up: 1 year(s) in-person, or earlier for new or changing lesions    Staff and Medical Student:     Suzette Perez, MS3   [employer]       ____________________________________________    CC: Skin Check (Khanh is here today for a full body skin examination. Has new areas of concern on his right groin area. )    HPI:  Mr. Kem Almonte is a(n) 49 year old male , with family history of BCC, who presents today as a new patient for a full body skin evaluation. He has some concerns about 3 small lesions on his right inguinal fold that he would like evaluated. He thinks they have changed over time. He denies any itching, pain, burning, or bleeding associated with the lesions. He thinks \"they're probably skin " "tags\" but given his family history of NMSC, has some worries. He uses sunscreen (SPF30+) on his face and scalp most days but not on a regular basis and usually not during flights.     Patient is otherwise feeling well, without additional skin concerns.    Labs:  None reviewed.    Physical Exam:  Vitals: There were no vitals taken for this visit.  SKIN: Total skin excluding the undergarment areas was performed. The exam included the head/face, neck, both arms, chest, back, abdomen, both legs, digits and/or nails.   - three 2-5mm tan to brown papules with reticular pattern on right inguinal fold  - few 1-2mm red papules on chest and abdomen   - multiple light brown to brown scattered macules and papules with reticular pattern on back   - No other lesions of concern on areas examined.     Medications:  Current Outpatient Medications   Medication Sig Dispense Refill    Ascorbic Acid (VITAMIN C PO) Take 1,000 mg by mouth daily      cholecalciferol, vitamin D3, (VITAMIN D3 ORAL) Take 300 mg by mouth daily      Coenzyme Q10 (COQ-10 PO) Take 300 mg by mouth daily      COLLAGEN PO       GLUTATHIONE PO Take 2,000 mg by mouth daily      MILK THISTLE PO       MULTIVITAMIN ORAL [MULTIVITAMIN ORAL] Take 1 tablet by mouth daily.      omega-3/dha/epa/fish oil (FISH OIL-OMEGA-3 FATTY ACIDS) 300-1,000 mg capsule [OMEGA-3/DHA/EPA/FISH OIL (FISH OIL-OMEGA-3 FATTY ACIDS) 300-1,000 MG CAPSULE] Take 2 g by mouth daily.      sildenafil (REVATIO) 20 MG tablet       Turmeric 500 MG CAPS Take 500 mg by mouth daily      UNABLE TO FIND POWDERS IN SMOOTHIS: Creatine Powder, Beta Alanine, D-Ribrose, Magnesium Citrate, Arginine       No current facility-administered medications for this visit.      Past Medical History:   Patient Active Problem List   Diagnosis    Overweight (BMI 25.0-29.9)    Podagra - left bunion joint gout, typically every June.    Chronic gout without tophus, unspecified cause, unspecified site    Hyperlipidemia with target " LDL less than 70    Erectile dysfunction, unspecified erectile dysfunction type    Anxiety    Alcohol use     History reviewed. No pertinent past medical history.     CC Referred Self, MD  No address on file on close of this encounter.

## 2025-02-19 ENCOUNTER — PATIENT OUTREACH (OUTPATIENT)
Dept: CARE COORDINATION | Facility: CLINIC | Age: 50
End: 2025-02-19
Payer: COMMERCIAL

## 2025-03-05 ENCOUNTER — PATIENT OUTREACH (OUTPATIENT)
Dept: CARE COORDINATION | Facility: CLINIC | Age: 50
End: 2025-03-05
Payer: COMMERCIAL

## 2025-04-27 ENCOUNTER — HEALTH MAINTENANCE LETTER (OUTPATIENT)
Age: 50
End: 2025-04-27

## 2025-09-01 SDOH — HEALTH STABILITY: PHYSICAL HEALTH: ON AVERAGE, HOW MANY MINUTES DO YOU ENGAGE IN EXERCISE AT THIS LEVEL?: 40 MIN

## 2025-09-01 SDOH — HEALTH STABILITY: PHYSICAL HEALTH: ON AVERAGE, HOW MANY DAYS PER WEEK DO YOU ENGAGE IN MODERATE TO STRENUOUS EXERCISE (LIKE A BRISK WALK)?: 5 DAYS

## 2025-09-02 ENCOUNTER — OFFICE VISIT (OUTPATIENT)
Dept: INTERNAL MEDICINE | Facility: CLINIC | Age: 50
End: 2025-09-02
Payer: COMMERCIAL

## 2025-09-02 ENCOUNTER — HOSPITAL ENCOUNTER (OUTPATIENT)
Dept: GENERAL RADIOLOGY | Facility: HOSPITAL | Age: 50
Discharge: HOME | End: 2025-09-02
Attending: INTERNAL MEDICINE
Payer: COMMERCIAL

## 2025-09-02 VITALS
WEIGHT: 191.3 LBS | BODY MASS INDEX: 27.39 KG/M2 | HEART RATE: 64 BPM | DIASTOLIC BLOOD PRESSURE: 82 MMHG | HEIGHT: 70 IN | RESPIRATION RATE: 18 BRPM | TEMPERATURE: 98.4 F | OXYGEN SATURATION: 97 % | SYSTOLIC BLOOD PRESSURE: 126 MMHG

## 2025-09-02 DIAGNOSIS — Z13.1 SCREENING FOR DIABETES MELLITUS: ICD-10-CM

## 2025-09-02 DIAGNOSIS — Z78.9 NONSMOKER: ICD-10-CM

## 2025-09-02 DIAGNOSIS — Z13.29 SCREENING FOR ENDOCRINE/METABOLIC/IMMUNITY DISORDERS: ICD-10-CM

## 2025-09-02 DIAGNOSIS — Z13.220 LIPID SCREENING: ICD-10-CM

## 2025-09-02 DIAGNOSIS — E66.3 OVERWEIGHT: ICD-10-CM

## 2025-09-02 DIAGNOSIS — M25.571 ACUTE RIGHT ANKLE PAIN: ICD-10-CM

## 2025-09-02 DIAGNOSIS — M10.072 ACUTE IDIOPATHIC GOUT INVOLVING TOE OF LEFT FOOT: ICD-10-CM

## 2025-09-02 DIAGNOSIS — Z00.00 ROUTINE PHYSICAL EXAMINATION: Primary | ICD-10-CM

## 2025-09-02 DIAGNOSIS — S92.251A CLOSED DISPLACED FRACTURE OF NAVICULAR BONE OF RIGHT FOOT, INITIAL ENCOUNTER: ICD-10-CM

## 2025-09-02 DIAGNOSIS — Z13.228 SCREENING FOR ENDOCRINE/METABOLIC/IMMUNITY DISORDERS: ICD-10-CM

## 2025-09-02 DIAGNOSIS — Z13.0 SCREENING FOR ENDOCRINE/METABOLIC/IMMUNITY DISORDERS: ICD-10-CM

## 2025-09-02 DIAGNOSIS — Z13.71 BRCA NEGATIVE: ICD-10-CM

## 2025-09-02 DIAGNOSIS — Z12.5 SCREENING FOR PROSTATE CANCER: ICD-10-CM

## 2025-09-02 PROBLEM — E78.2 MIXED HYPERLIPIDEMIA: Status: ACTIVE | Noted: 2021-02-08

## 2025-09-02 LAB
ALBUMIN SERPL BCG-MCNC: 4.4 G/DL (ref 3.5–5.2)
ALP SERPL-CCNC: 50 U/L (ref 40–150)
ALT SERPL W P-5'-P-CCNC: 31 U/L (ref 0–70)
ANION GAP SERPL CALCULATED.3IONS-SCNC: 11 MMOL/L (ref 7–15)
AST SERPL W P-5'-P-CCNC: 32 U/L (ref 0–45)
BILIRUB SERPL-MCNC: 0.3 MG/DL
BUN SERPL-MCNC: 34.1 MG/DL (ref 6–20)
CALCIUM SERPL-MCNC: 9.4 MG/DL (ref 8.8–10.4)
CHLORIDE SERPL-SCNC: 101 MMOL/L (ref 98–107)
CHOLEST SERPL-MCNC: 238 MG/DL
CREAT SERPL-MCNC: 1.27 MG/DL (ref 0.67–1.17)
EGFRCR SERPLBLD CKD-EPI 2021: 68 ML/MIN/1.73M2
EST. AVERAGE GLUCOSE BLD GHB EST-MCNC: 103 MG/DL
FASTING STATUS PATIENT QL REPORTED: NO
FASTING STATUS PATIENT QL REPORTED: NO
GLUCOSE SERPL-MCNC: 92 MG/DL (ref 70–99)
HBA1C MFR BLD: 5.2 % (ref 0–5.6)
HCO3 SERPL-SCNC: 27 MMOL/L (ref 22–29)
HDLC SERPL-MCNC: 89 MG/DL
LDLC SERPL CALC-MCNC: 100 MG/DL
NONHDLC SERPL-MCNC: 149 MG/DL
POTASSIUM SERPL-SCNC: 4.3 MMOL/L (ref 3.4–5.3)
PROT SERPL-MCNC: 7.3 G/DL (ref 6.4–8.3)
PSA SERPL DL<=0.01 NG/ML-MCNC: 0.7 NG/ML (ref 0–3.5)
SODIUM SERPL-SCNC: 139 MMOL/L (ref 135–145)
TRIGL SERPL-MCNC: 247 MG/DL
TSH SERPL DL<=0.005 MIU/L-ACNC: 1.69 UIU/ML (ref 0.3–4.2)
URATE SERPL-MCNC: 6.5 MG/DL (ref 3.4–7)

## 2025-09-02 PROCEDURE — 73610 X-RAY EXAM OF ANKLE: CPT | Mod: RT

## 2025-09-02 RX ORDER — INDOMETHACIN 50 MG/1
50 CAPSULE ORAL
COMMUNITY
Start: 2025-08-21

## 2025-09-02 ASSESSMENT — PAIN SCALES - GENERAL: PAINLEVEL_OUTOF10: MODERATE PAIN (4)

## (undated) DEVICE — KIT ENDO TURNOVER/PROCEDURE CARRY-ON 101822

## (undated) DEVICE — SUCTION MANIFOLD NEPTUNE 2 SYS 1 PORT 702-025-000

## (undated) DEVICE — FCP BIOPSY RADIAL JAW 4 JUMBO 3.2MM CHANNEL M00513370

## (undated) DEVICE — TUBING SUCTION 12"X1/4" N612

## (undated) DEVICE — SOL WATER IRRIG 500ML BOTTLE 2F7113

## (undated) DEVICE — GOWN IMPERVIOUS 2XL BLUE

## (undated) DEVICE — SPECIMEN CONTAINER 3OZ W/FORMALIN 59901

## (undated) DEVICE — SNARE CAPIVATOR ROUND COLD SNR BX10 M00561101